# Patient Record
Sex: FEMALE | Race: WHITE | NOT HISPANIC OR LATINO | Employment: OTHER | URBAN - NONMETROPOLITAN AREA
[De-identification: names, ages, dates, MRNs, and addresses within clinical notes are randomized per-mention and may not be internally consistent; named-entity substitution may affect disease eponyms.]

---

## 2017-05-04 ENCOUNTER — OFFICE VISIT (OUTPATIENT)
Dept: PULMONOLOGY | Facility: CLINIC | Age: 72
End: 2017-05-04

## 2017-05-04 VITALS
HEART RATE: 113 BPM | BODY MASS INDEX: 21.09 KG/M2 | HEIGHT: 63 IN | DIASTOLIC BLOOD PRESSURE: 69 MMHG | WEIGHT: 119 LBS | OXYGEN SATURATION: 98 % | SYSTOLIC BLOOD PRESSURE: 112 MMHG

## 2017-05-04 DIAGNOSIS — R01.1 MURMUR, CARDIAC: ICD-10-CM

## 2017-05-04 DIAGNOSIS — R93.89 ABNORMAL CXR: Primary | ICD-10-CM

## 2017-05-04 DIAGNOSIS — Z87.891 HISTORY OF TOBACCO USE: ICD-10-CM

## 2017-05-04 PROCEDURE — 94060 EVALUATION OF WHEEZING: CPT | Performed by: INTERNAL MEDICINE

## 2017-05-04 PROCEDURE — 99204 OFFICE O/P NEW MOD 45 MIN: CPT | Performed by: INTERNAL MEDICINE

## 2017-05-04 PROCEDURE — 94727 GAS DIL/WSHOT DETER LNG VOL: CPT | Performed by: INTERNAL MEDICINE

## 2017-05-04 PROCEDURE — 94729 DIFFUSING CAPACITY: CPT | Performed by: INTERNAL MEDICINE

## 2017-05-30 ENCOUNTER — HOSPITAL ENCOUNTER (OUTPATIENT)
Dept: CT IMAGING | Facility: HOSPITAL | Age: 72
Discharge: HOME | End: 2017-05-30
Admitting: INTERNAL MEDICINE

## 2017-05-30 DIAGNOSIS — R93.89 ABNORMAL CXR: ICD-10-CM

## 2017-05-30 PROCEDURE — 71250 CT THORAX DX C-: CPT

## 2017-06-06 ENCOUNTER — OFFICE VISIT (OUTPATIENT)
Dept: INTERNAL MEDICINE | Facility: CLINIC | Age: 72
End: 2017-06-06

## 2017-06-06 ENCOUNTER — OFFICE VISIT (OUTPATIENT)
Dept: PULMONOLOGY | Facility: CLINIC | Age: 72
End: 2017-06-06

## 2017-06-06 VITALS
SYSTOLIC BLOOD PRESSURE: 140 MMHG | DIASTOLIC BLOOD PRESSURE: 60 MMHG | HEIGHT: 63 IN | WEIGHT: 122.4 LBS | BODY MASS INDEX: 21.69 KG/M2

## 2017-06-06 VITALS
HEIGHT: 63 IN | WEIGHT: 122 LBS | SYSTOLIC BLOOD PRESSURE: 120 MMHG | DIASTOLIC BLOOD PRESSURE: 78 MMHG | HEART RATE: 67 BPM | BODY MASS INDEX: 21.62 KG/M2 | OXYGEN SATURATION: 99 %

## 2017-06-06 DIAGNOSIS — Z12.4 ENCOUNTER FOR PAPANICOLAOU SMEAR FOR CERVICAL CANCER SCREENING: ICD-10-CM

## 2017-06-06 DIAGNOSIS — Z01.419 ENCOUNTER FOR WELL WOMAN EXAM WITH ROUTINE GYNECOLOGICAL EXAM: Primary | ICD-10-CM

## 2017-06-06 DIAGNOSIS — Z87.891 HISTORY OF TOBACCO USE: ICD-10-CM

## 2017-06-06 DIAGNOSIS — Z13.820 ENCOUNTER FOR SCREENING FOR OSTEOPOROSIS: ICD-10-CM

## 2017-06-06 DIAGNOSIS — Z11.59 NEED FOR HEPATITIS C SCREENING TEST: ICD-10-CM

## 2017-06-06 DIAGNOSIS — R93.89 ABNORMAL CXR: Primary | ICD-10-CM

## 2017-06-06 DIAGNOSIS — Z12.31 ENCOUNTER FOR SCREENING MAMMOGRAM FOR BREAST CANCER: ICD-10-CM

## 2017-06-06 DIAGNOSIS — E78.2 MIXED HYPERLIPIDEMIA: ICD-10-CM

## 2017-06-06 PROCEDURE — 88142 CYTOPATH C/V THIN LAYER: CPT | Performed by: INTERNAL MEDICINE

## 2017-06-06 PROCEDURE — G0101 CA SCREEN;PELVIC/BREAST EXAM: HCPCS | Performed by: INTERNAL MEDICINE

## 2017-06-06 PROCEDURE — 99213 OFFICE O/P EST LOW 20 MIN: CPT | Performed by: INTERNAL MEDICINE

## 2017-06-06 NOTE — PROGRESS NOTES
Pulmonary Office Follow-up    Subjective     Kimi Moraes is seen today at the office for   Chief Complaint   Patient presents with   • Follow-up     2 week         HPI  Kimi Moraes is a 72 y.o. female with a PMH significant for allergies and past tobacco use who presents forR review of recent CT chest.  She was initially seen by me on 5/4/17 for abnormal chest x-ray and I recommended obtaining a CT chest to determine if she had a lung nodule.  Since her last visit, she has established care with Dr. Santos and is set up to have routine health maintenance testing.  She has had no issues or complaints.        Patient Active Problem List   Diagnosis   • Abnormal CXR   • History of tobacco use   • Murmur, cardiac       Review of Systems  Review of Systems   Constitutional: Negative for fever and unexpected weight change.   HENT: Negative for congestion.    Respiratory: Negative for cough, shortness of breath and wheezing.    Cardiovascular: Negative for chest pain.     As described in the HPI. Otherwise, remainder of ROS (14 systems) were negative.    Medications, Allergies, Social, and Family Histories reviewed as per EMR.    Objective     Vitals:    06/06/17 1109   BP: 120/78   Pulse: 67   SpO2: 99%     Physical Exam   Constitutional: She is oriented to person, place, and time. Vital signs are normal. She appears well-developed and well-nourished.   HENT:   Head: Normocephalic and atraumatic.   Nose: Nose normal.   Mouth/Throat: Uvula is midline, oropharynx is clear and moist and mucous membranes are normal.   Mallampati 1   Eyes: Conjunctivae, EOM and lids are normal.   Neck: Trachea normal and normal range of motion. No tracheal tenderness present. No thyroid mass present.   Cardiovascular: Normal rate, regular rhythm and normal heart sounds.  Exam reveals no gallop.    No murmur heard.  Pulmonary/Chest: Effort normal and breath sounds normal.   Abdominal: Soft. Normal appearance and bowel sounds are normal.  There is no tenderness.       Vascular Status -  Her exam exhibits no right foot edema. Her exam exhibits no left foot edema.  Lymphadenopathy:        Head (right side): No submandibular adenopathy present.        Head (left side): No submandibular adenopathy present.     She has no cervical adenopathy.        Right: No supraclavicular adenopathy present.        Left: No supraclavicular adenopathy present.   Neurological: She is alert and oriented to person, place, and time.   Skin: Skin is warm and dry. No cyanosis. Nails show no clubbing.   Psychiatric: She has a normal mood and affect. Her speech is normal and behavior is normal. Judgment normal.   Nursing note and vitals reviewed.      IMAGING: CT chest without contrast 5/30/17 (independently reviewed and interpreted by me) showed no pulmonary nodule      Assessment/Plan     Kimi was seen today for follow-up.    Diagnoses and all orders for this visit:    Abnormal CXR    History of tobacco use         Discussion/ Recommendations:   CT of the chest did not show any nodules or other acute findings.  At this point, there is no further testing or surveillance necessary.  I have encouraged patient to proceed with health maintenance screening as recommended by Dr. Santos, as well as continuing to maintain a healthy lifestyle.    Return if symptoms worsen or fail to improve.          This document has been electronically signed by Paula De Santiago MD on June 6, 2017 11:30 AM      Dictated using Dragon

## 2017-06-07 NOTE — PROGRESS NOTES
"Subjective   History of Present Illness    Kimi Moraes is a 72 y.o. female who presents for GYN exam.  She reports no significant PMH except of hyperlipidemia. She has not had her cholesterol checked for several years  Patient has no GYN complaints.      Sexually active?: Yes  Postmenopausal?: Yes  HRT?: No  Hysterectomy?: No    Date last pap:   History of abnormal Pap smear: No  Date of last mammogram:   History of abnormal mammogram: No     History of bone density- several years ago.  Colonoscopy: 2014. - Hyperplastic polyp in sigmoid colon.    /60  Ht 63\" (160 cm)  Wt 122 lb 6.4 oz (55.5 kg)  BMI 21.68 kg/m2    Past Medical History:   Diagnosis Date   • Heart murmur    • Urinary tract infection    • Visual impairment        Past Surgical History:   Procedure Laterality Date   • COLONOSCOPY     • EYE SURGERY     • VARICOSE VEIN SURGERY         The following portions of the patient's history were reviewed and updated as appropriate: allergies, current medications, past family history, past medical history, past social history, past surgical history and problem list.    Review of Systems    Constitutional:  No fatigue, No weight loss, No weight gain, No fever and No chills   Respiratory: No dyspnea, No cough, No hemopytysis, No wheezing and No pleuritic pain   Cardiovascular: No chest pain, No palpitations, No arrhythmia, No orthopnea, No noctural dyspnea, No edema and No claudication   Breasts: No discharge from nipple, No breast tenderness and No breast mass   Gastrointestinal: No loss of appetite, No dysphagia, No abdominal pain, No nausea, No vomiting, No change in bowel habits, No diarrhea, No constipation and No blood in stool   Genitourinary: No increased frequency of urination, No dysuria, No hematuria, No nocturia, No urinary incontinence, No vaginal discharge, No abnormal vaginal bleeding, No pelvic pain, No menstrual problem and No menopausal problem   Skin: No skin rash, No " skin lesion, No dry skin, No pruritus and No nail problem   Neurologic: No headache, No dizziness, No lightheadedness, No syncope, No vertigo, No weakness, No numbness, No tremor and No paresthesia   Psychiatric: No difficulty sleeping, No mood swings, No feeling anxious, No confusion and No memory loss          Objective   Physical Exam    General:  Alert and oriented x 3, Cooperative, Well developed & well nourished and No acute distress   HEENT:  PERRLA, EOMI. Throat: clear. Ears: normal tympanic membranes.   Neck:    No JVD. No mass.   Abdomen: Soft, nondistended, non-tender, without masses or organomegaly. Rectal - no masses. Stool is heme negative.   Lungs: Clear. Good effort.   Cardiac: Regular. Murmur heard   Breast: Inspection negative, no nipple discharge or bleeding, no masses or nodularity palpable and Symmetrical breasts in shape, size, consistency   Genitourinary: Vulva normal, vaginal mucosa normal, bladder nondistended and nontender, cervix normal, uterus normal size and nontender, no adnexal/pelvic tenderness or masses, Bartholin's/[place]/Urethral gland WNL. PAP smear obtained.   Skin: Skin warm and dry and Pattern of hair growth normal       Assessment/Plan   Kimi was seen today for establish care.    Diagnoses and all orders for this visit:    Encounter for well woman exam with routine gynecological exam  -     CBC & Differential  -     Comprehensive Metabolic Panel    Encounter for screening mammogram for breast cancer  -     Mammo Screening Digital Tomosynthesis Bilateral With CAD    Encounter for Papanicolaou smear for cervical cancer screening  -     Cytology, thin prep pap (cervical)    Encounter for screening for osteoporosis  -     DEXA Bone Density Axial    Mixed hyperlipidemia  -     Lipid Panel  -     TSH    Need for hepatitis C screening test  -     Hepatitis C Antibody      All questions answered.  Recommended self breast exam  Pap smear obtained.  Labs today.  Mammogram.  Discussed  healthy lifestyle modifications.   Counseled on nutrition and physical activity.  Advised to start taking Calcium  mg bid and vitamin D 1000 Units for prevention of osteoporosis.        Patient declines immunization.      Follow up in 6 months.

## 2017-06-08 LAB
LAB AP CASE REPORT: NORMAL
LAB AP GYN ADDITIONAL INFORMATION: NORMAL
LAB AP GYN OTHER FINDINGS: NORMAL
Lab: NORMAL
PATH INTERP SPEC-IMP: NORMAL
STAT OF ADQ CVX/VAG CYTO-IMP: NORMAL

## 2017-06-19 ENCOUNTER — TELEPHONE (OUTPATIENT)
Dept: INTERNAL MEDICINE | Facility: CLINIC | Age: 72
End: 2017-06-19

## 2017-06-19 ENCOUNTER — APPOINTMENT (OUTPATIENT)
Dept: LAB | Facility: HOSPITAL | Age: 72
End: 2017-06-19

## 2017-06-19 LAB
ALBUMIN SERPL-MCNC: 4.1 G/DL (ref 3.4–4.8)
ALBUMIN/GLOB SERPL: 1.5 G/DL (ref 1.1–1.8)
ALP SERPL-CCNC: 61 U/L (ref 38–126)
ALT SERPL W P-5'-P-CCNC: 34 U/L (ref 9–52)
ANION GAP SERPL CALCULATED.3IONS-SCNC: 11 MMOL/L (ref 5–15)
ARTICHOKE IGE QN: 203 MG/DL (ref 1–129)
AST SERPL-CCNC: 32 U/L (ref 14–36)
BASOPHILS # BLD AUTO: 0.02 10*3/MM3 (ref 0–0.2)
BASOPHILS NFR BLD AUTO: 0.3 % (ref 0–2)
BILIRUB SERPL-MCNC: 0.6 MG/DL (ref 0.2–1.3)
BUN BLD-MCNC: 12 MG/DL (ref 7–21)
BUN/CREAT SERPL: 17.4 (ref 7–25)
CALCIUM SPEC-SCNC: 9.2 MG/DL (ref 8.4–10.2)
CHLORIDE SERPL-SCNC: 100 MMOL/L (ref 95–110)
CHOLEST SERPL-MCNC: 277 MG/DL (ref 0–199)
CO2 SERPL-SCNC: 29 MMOL/L (ref 22–31)
CREAT BLD-MCNC: 0.69 MG/DL (ref 0.5–1)
DEPRECATED RDW RBC AUTO: 40.8 FL (ref 36.4–46.3)
EOSINOPHIL # BLD AUTO: 0.04 10*3/MM3 (ref 0–0.7)
EOSINOPHIL NFR BLD AUTO: 0.6 % (ref 0–7)
ERYTHROCYTE [DISTWIDTH] IN BLOOD BY AUTOMATED COUNT: 12.7 % (ref 11.5–14.5)
GFR SERPL CREATININE-BSD FRML MDRD: 84 ML/MIN/1.73 (ref 39–90)
GLOBULIN UR ELPH-MCNC: 2.7 GM/DL (ref 2.3–3.5)
GLUCOSE BLD-MCNC: 140 MG/DL (ref 60–100)
HCT VFR BLD AUTO: 42.8 % (ref 35–45)
HDLC SERPL-MCNC: 71 MG/DL (ref 60–200)
HGB BLD-MCNC: 14.8 G/DL (ref 12–15.5)
IMM GRANULOCYTES # BLD: 0.02 10*3/MM3 (ref 0–0.02)
IMM GRANULOCYTES NFR BLD: 0.3 % (ref 0–0.5)
LDLC/HDLC SERPL: 2.61 {RATIO} (ref 0–3.22)
LYMPHOCYTES # BLD AUTO: 1.03 10*3/MM3 (ref 0.6–4.2)
LYMPHOCYTES NFR BLD AUTO: 16.4 % (ref 10–50)
MCH RBC QN AUTO: 30.6 PG (ref 26.5–34)
MCHC RBC AUTO-ENTMCNC: 34.6 G/DL (ref 31.4–36)
MCV RBC AUTO: 88.6 FL (ref 80–98)
MONOCYTES # BLD AUTO: 0.48 10*3/MM3 (ref 0–0.9)
MONOCYTES NFR BLD AUTO: 7.6 % (ref 0–12)
NEUTROPHILS # BLD AUTO: 4.7 10*3/MM3 (ref 2–8.6)
NEUTROPHILS NFR BLD AUTO: 74.8 % (ref 37–80)
PLATELET # BLD AUTO: 253 10*3/MM3 (ref 150–450)
PMV BLD AUTO: 10.9 FL (ref 8–12)
POTASSIUM BLD-SCNC: 4.1 MMOL/L (ref 3.5–5.1)
PROT SERPL-MCNC: 6.8 G/DL (ref 6.3–8.6)
RBC # BLD AUTO: 4.83 10*6/MM3 (ref 3.77–5.16)
SODIUM BLD-SCNC: 140 MMOL/L (ref 137–145)
TRIGL SERPL-MCNC: 102 MG/DL (ref 20–199)
TSH SERPL DL<=0.05 MIU/L-ACNC: 1.6 MIU/ML (ref 0.46–4.68)
WBC NRBC COR # BLD: 6.29 10*3/MM3 (ref 3.2–9.8)

## 2017-06-19 PROCEDURE — 85025 COMPLETE CBC W/AUTO DIFF WBC: CPT | Performed by: INTERNAL MEDICINE

## 2017-06-19 PROCEDURE — 80053 COMPREHEN METABOLIC PANEL: CPT | Performed by: INTERNAL MEDICINE

## 2017-06-19 PROCEDURE — 36415 COLL VENOUS BLD VENIPUNCTURE: CPT | Performed by: INTERNAL MEDICINE

## 2017-06-19 PROCEDURE — 84443 ASSAY THYROID STIM HORMONE: CPT | Performed by: INTERNAL MEDICINE

## 2017-06-19 PROCEDURE — 86803 HEPATITIS C AB TEST: CPT | Performed by: INTERNAL MEDICINE

## 2017-06-19 PROCEDURE — 80061 LIPID PANEL: CPT | Performed by: INTERNAL MEDICINE

## 2017-06-19 NOTE — TELEPHONE ENCOUNTER
Spoke with pt let her know bonedensity shows osteopenia DR SANCHEZ recommends otc vitamin d 1000mcg daily  And  calcium 500mg bid

## 2017-06-20 ENCOUNTER — TELEPHONE (OUTPATIENT)
Dept: INTERNAL MEDICINE | Facility: CLINIC | Age: 72
End: 2017-06-20

## 2017-06-20 DIAGNOSIS — R92.8 ABNORMALITY OF RIGHT BREAST ON SCREENING MAMMOGRAM: Primary | ICD-10-CM

## 2017-06-20 NOTE — TELEPHONE ENCOUNTER
Spoke with pt let her  know there was a questionable area on right breast they requiring more imaging and DR SANCHEZ  Has odered these and [facility] will call her with an appt....

## 2017-06-21 LAB — HCV AB SER DONR QL: NEGATIVE

## 2018-10-31 ENCOUNTER — OFFICE VISIT (OUTPATIENT)
Dept: FAMILY MEDICINE CLINIC | Facility: CLINIC | Age: 73
End: 2018-10-31

## 2018-10-31 VITALS
HEART RATE: 108 BPM | BODY MASS INDEX: 22.25 KG/M2 | HEIGHT: 63 IN | WEIGHT: 125.6 LBS | TEMPERATURE: 98.1 F | DIASTOLIC BLOOD PRESSURE: 70 MMHG | SYSTOLIC BLOOD PRESSURE: 150 MMHG

## 2018-10-31 DIAGNOSIS — J01.10 ACUTE NON-RECURRENT FRONTAL SINUSITIS: Primary | ICD-10-CM

## 2018-10-31 DIAGNOSIS — K12.0 ORAL APHTHOUS ULCER: ICD-10-CM

## 2018-10-31 PROCEDURE — 99213 OFFICE O/P EST LOW 20 MIN: CPT | Performed by: NURSE PRACTITIONER

## 2018-10-31 RX ORDER — TRIAMCINOLONE ACETONIDE 40 MG/ML
80 INJECTION, SUSPENSION INTRA-ARTICULAR; INTRAMUSCULAR ONCE
Status: DISCONTINUED | OUTPATIENT
Start: 2018-10-31 | End: 2018-11-27

## 2018-10-31 RX ORDER — AZITHROMYCIN 200 MG/5ML
POWDER, FOR SUSPENSION ORAL
Qty: 42 ML | Refills: 0 | Status: SHIPPED | OUTPATIENT
Start: 2018-10-31 | End: 2018-11-27

## 2018-10-31 RX ORDER — FLUTICASONE PROPIONATE 50 MCG
2 SPRAY, SUSPENSION (ML) NASAL DAILY
Qty: 1 BOTTLE | Refills: 0 | Status: SHIPPED | OUTPATIENT
Start: 2018-10-31 | End: 2018-11-27

## 2018-10-31 NOTE — PROGRESS NOTES
Subjective   Kimi Buck is a 73 y.o. female. Patient here today with complaints of URI  pt here today with complaints of sinus congestion, pain and pressure, sore in mouth, saw walk in last week, was given magic mouthwash and soreness in mouth has improved but still sore slightly with eating spicy foods.     Vitals:    10/31/18 1023   BP: 150/70   Pulse: 108   Temp: 98.1 °F (36.7 °C)     Past Medical History:   Diagnosis Date   • Heart murmur    • Urinary tract infection    • Visual impairment      Sinusitis   This is a new problem. The current episode started in the past 7 days. The problem has been gradually worsening since onset. There has been no fever. The pain is moderate. Associated symptoms include congestion, coughing, headaches, a hoarse voice, sinus pressure, sneezing and a sore throat. Pertinent negatives include no chills, diaphoresis, ear pain, neck pain, shortness of breath or swollen glands. Past treatments include nothing. The treatment provided no relief.        The following portions of the patient's history were reviewed and updated as appropriate: allergies, current medications, past family history, past medical history, past social history, past surgical history and problem list.    Review of Systems   Constitutional: Negative.  Negative for chills and diaphoresis.   HENT: Positive for congestion, hoarse voice, sinus pressure, sneezing and sore throat. Negative for ear pain.    Eyes: Negative.    Respiratory: Positive for cough. Negative for shortness of breath.    Cardiovascular: Negative.    Gastrointestinal: Negative.    Endocrine: Negative.    Genitourinary: Negative.    Musculoskeletal: Negative.  Negative for neck pain.   Skin: Negative.    Allergic/Immunologic: Negative.    Neurological: Positive for headaches.   Hematological: Negative.    Psychiatric/Behavioral: Negative.        Objective   Physical Exam   Constitutional: She is oriented to person, place, and time. She appears  well-developed and well-nourished. No distress.   HENT:   Head: Normocephalic and atraumatic.   Right Ear: Hearing, external ear and ear canal normal. Tympanic membrane is bulging.   Left Ear: Hearing, external ear and ear canal normal. Tympanic membrane is bulging.   Nose: Rhinorrhea present. Right sinus exhibits maxillary sinus tenderness and frontal sinus tenderness. Left sinus exhibits maxillary sinus tenderness and frontal sinus tenderness.   Mouth/Throat: Uvula is midline and mucous membranes are normal. Oral lesions present. Posterior oropharyngeal erythema (with erythema and cobblestoning appearance ntoed) present. No tonsillar exudate.   Oral ulcerations on upper gums bilat, mildly erythematous and tender as well    Neck: Neck supple.   Cardiovascular: Normal rate, regular rhythm and normal heart sounds.  Exam reveals no gallop and no friction rub.    No murmur heard.  Pulmonary/Chest: Effort normal and breath sounds normal. No respiratory distress. She has no wheezes. She has no rales.   Lymphadenopathy:     She has no cervical adenopathy.   Neurological: She is alert and oriented to person, place, and time.   Skin: Skin is warm and dry. No rash noted. She is not diaphoretic. No erythema. No pallor.   Psychiatric: She has a normal mood and affect. Her behavior is normal.   Nursing note and vitals reviewed.      Assessment/Plan   Kimi was seen today for uri.    Diagnoses and all orders for this visit:    Acute non-recurrent frontal sinusitis  -     triamcinolone acetonide (KENALOG-40) injection 80 mg; Inject 2 mL into the appropriate muscle as directed by prescriber 1 (One) Time.    Oral aphthous ulcer    Other orders  -     fluticasone (FLONASE) 50 MCG/ACT nasal spray; 2 sprays into the nostril(s) as directed by provider Daily.  -     azithromycin (ZITHROMAX) 200 MG/5ML suspension; Give the patient 572 mg (14 ml) by mouth the first day then 284 mg (7 ml) by mouth daily for 4 days.  -     diphenhydrAMINE  12.5 MG/5ML elixir 20 mL, aluminum-magnesium hydroxide-simethicone 400-400-40 MG/5ML suspension 20 mL, lidocaine viscous 2 % solution 15 mL; Swish and spit 15 mL Every 4 (Four) Hours As Needed for Mucositis or stomatitis.    Patient relates she can take liquid medication, given Zithromax suspension, Flonase, Kenalog injection and magic mouthwash prescription.  If symptoms should persist or worsen she will return to clinic for follow-up.  She will sign release for records since she wants to establish care here.  We'll follow-up as needed for chronic conditions/refills.  She is aware and is in agreement to this plan.  All questions and concerns are addressed with understanding noted.

## 2018-11-27 ENCOUNTER — OFFICE VISIT (OUTPATIENT)
Dept: FAMILY MEDICINE CLINIC | Facility: CLINIC | Age: 73
End: 2018-11-27

## 2018-11-27 ENCOUNTER — LAB (OUTPATIENT)
Dept: LAB | Facility: OTHER | Age: 73
End: 2018-11-27

## 2018-11-27 VITALS
OXYGEN SATURATION: 98 % | DIASTOLIC BLOOD PRESSURE: 70 MMHG | WEIGHT: 119.8 LBS | BODY MASS INDEX: 21.23 KG/M2 | HEIGHT: 63 IN | HEART RATE: 115 BPM | SYSTOLIC BLOOD PRESSURE: 130 MMHG | TEMPERATURE: 97.6 F

## 2018-11-27 DIAGNOSIS — K12.0 ORAL APHTHOUS ULCER: ICD-10-CM

## 2018-11-27 DIAGNOSIS — R13.10 DYSPHAGIA, UNSPECIFIED TYPE: ICD-10-CM

## 2018-11-27 DIAGNOSIS — E53.8 LOW VITAMIN B12 LEVEL: ICD-10-CM

## 2018-11-27 DIAGNOSIS — R93.3 ABNORMAL FINDINGS ON DIAGNOSTIC IMAGING OF OTHER PARTS OF DIGESTIVE TRACT: ICD-10-CM

## 2018-11-27 DIAGNOSIS — R73.09 ELEVATED GLUCOSE: ICD-10-CM

## 2018-11-27 DIAGNOSIS — R68.2 DRY MOUTH: ICD-10-CM

## 2018-11-27 DIAGNOSIS — Z12.39 BREAST CANCER SCREENING: ICD-10-CM

## 2018-11-27 DIAGNOSIS — R13.10 DYSPHAGIA, UNSPECIFIED TYPE: Primary | ICD-10-CM

## 2018-11-27 DIAGNOSIS — Z00.00 MEDICARE ANNUAL WELLNESS VISIT, INITIAL: Primary | ICD-10-CM

## 2018-11-27 LAB
ALBUMIN SERPL-MCNC: 4.6 G/DL (ref 3.5–5)
ALBUMIN/GLOB SERPL: 2 G/DL (ref 1.1–1.8)
ALP SERPL-CCNC: 62 U/L (ref 38–126)
ALT SERPL W P-5'-P-CCNC: 17 U/L
ANION GAP SERPL CALCULATED.3IONS-SCNC: 8 MMOL/L (ref 5–15)
AST SERPL-CCNC: 23 U/L (ref 14–36)
BACTERIA UR QL AUTO: ABNORMAL /HPF
BASOPHILS # BLD AUTO: 0.01 10*3/MM3 (ref 0–0.2)
BASOPHILS NFR BLD AUTO: 0.2 % (ref 0–2)
BILIRUB SERPL-MCNC: 0.7 MG/DL (ref 0.2–1.3)
BILIRUB UR QL STRIP: NEGATIVE
BUN BLD-MCNC: 14 MG/DL (ref 7–17)
BUN/CREAT SERPL: 18.9 (ref 7–25)
CALCIUM SPEC-SCNC: 9.8 MG/DL (ref 8.4–10.2)
CHLORIDE SERPL-SCNC: 105 MMOL/L (ref 98–107)
CHOLEST SERPL-MCNC: 250 MG/DL (ref 150–200)
CLARITY UR: CLEAR
CO2 SERPL-SCNC: 29 MMOL/L (ref 22–30)
COLOR UR: YELLOW
CREAT BLD-MCNC: 0.74 MG/DL (ref 0.52–1.04)
DEPRECATED RDW RBC AUTO: 41.9 FL (ref 36.4–46.3)
EOSINOPHIL # BLD AUTO: 0.05 10*3/MM3 (ref 0–0.7)
EOSINOPHIL NFR BLD AUTO: 0.9 % (ref 0–7)
ERYTHROCYTE [DISTWIDTH] IN BLOOD BY AUTOMATED COUNT: 13.5 % (ref 11.5–14.5)
GFR SERPL CREATININE-BSD FRML MDRD: 77 ML/MIN/1.73 (ref 39–90)
GLOBULIN UR ELPH-MCNC: 2.3 GM/DL (ref 2.3–3.5)
GLUCOSE BLD-MCNC: 167 MG/DL (ref 74–99)
GLUCOSE UR STRIP-MCNC: NEGATIVE MG/DL
HCT VFR BLD AUTO: 37.9 % (ref 35–45)
HDLC SERPL-MCNC: 55 MG/DL (ref 40–59)
HGB BLD-MCNC: 12.4 G/DL (ref 12–15.5)
HGB UR QL STRIP.AUTO: NEGATIVE
HYALINE CASTS UR QL AUTO: ABNORMAL /LPF
KETONES UR QL STRIP: NEGATIVE
LDLC SERPL CALC-MCNC: 166 MG/DL
LDLC/HDLC SERPL: 3.01 {RATIO} (ref 0–3.22)
LEUKOCYTE ESTERASE UR QL STRIP.AUTO: ABNORMAL
LYMPHOCYTES # BLD AUTO: 1.22 10*3/MM3 (ref 0.6–4.2)
LYMPHOCYTES NFR BLD AUTO: 23 % (ref 10–50)
MCH RBC QN AUTO: 29 PG (ref 26.5–34)
MCHC RBC AUTO-ENTMCNC: 32.7 G/DL (ref 31.4–36)
MCV RBC AUTO: 88.6 FL (ref 80–98)
MONOCYTES # BLD AUTO: 0.58 10*3/MM3 (ref 0–0.9)
MONOCYTES NFR BLD AUTO: 10.9 % (ref 0–12)
MUCOUS THREADS URNS QL MICRO: ABNORMAL /HPF
NEUTROPHILS # BLD AUTO: 3.44 10*3/MM3 (ref 2–8.6)
NEUTROPHILS NFR BLD AUTO: 65 % (ref 37–80)
NITRITE UR QL STRIP: NEGATIVE
PH UR STRIP.AUTO: 5.5 [PH] (ref 5.5–8)
PLATELET # BLD AUTO: 288 10*3/MM3 (ref 150–450)
PMV BLD AUTO: 10.6 FL (ref 8–12)
POTASSIUM BLD-SCNC: 4.2 MMOL/L (ref 3.4–5)
PROT SERPL-MCNC: 6.9 G/DL (ref 6.3–8.2)
PROT UR QL STRIP: NEGATIVE
RBC # BLD AUTO: 4.28 10*6/MM3 (ref 3.77–5.16)
RBC # UR: ABNORMAL /HPF
REF LAB TEST METHOD: ABNORMAL
SODIUM BLD-SCNC: 142 MMOL/L (ref 137–145)
SP GR UR STRIP: >=1.03 (ref 1–1.03)
SQUAMOUS #/AREA URNS HPF: ABNORMAL /HPF
TRIGL SERPL-MCNC: 146 MG/DL
TSH SERPL DL<=0.05 MIU/L-ACNC: 2.62 MIU/ML (ref 0.46–4.68)
UROBILINOGEN UR QL STRIP: ABNORMAL
VIT B12 BLD-MCNC: 509 PG/ML (ref 239–931)
VLDLC SERPL-MCNC: 29.2 MG/DL
WBC NRBC COR # BLD: 5.3 10*3/MM3 (ref 3.2–9.8)
WBC UR QL AUTO: ABNORMAL /HPF

## 2018-11-27 PROCEDURE — 84443 ASSAY THYROID STIM HORMONE: CPT | Performed by: NURSE PRACTITIONER

## 2018-11-27 PROCEDURE — 80061 LIPID PANEL: CPT | Performed by: NURSE PRACTITIONER

## 2018-11-27 PROCEDURE — 81001 URINALYSIS AUTO W/SCOPE: CPT | Performed by: NURSE PRACTITIONER

## 2018-11-27 PROCEDURE — 85025 COMPLETE CBC W/AUTO DIFF WBC: CPT | Performed by: NURSE PRACTITIONER

## 2018-11-27 PROCEDURE — 96160 PT-FOCUSED HLTH RISK ASSMT: CPT | Performed by: NURSE PRACTITIONER

## 2018-11-27 PROCEDURE — 36415 COLL VENOUS BLD VENIPUNCTURE: CPT | Performed by: NURSE PRACTITIONER

## 2018-11-27 PROCEDURE — 82607 VITAMIN B-12: CPT | Performed by: NURSE PRACTITIONER

## 2018-11-27 PROCEDURE — 80053 COMPREHEN METABOLIC PANEL: CPT | Performed by: NURSE PRACTITIONER

## 2018-11-27 PROCEDURE — G0438 PPPS, INITIAL VISIT: HCPCS | Performed by: NURSE PRACTITIONER

## 2018-11-27 PROCEDURE — 83036 HEMOGLOBIN GLYCOSYLATED A1C: CPT | Performed by: NURSE PRACTITIONER

## 2018-11-27 PROCEDURE — 99213 OFFICE O/P EST LOW 20 MIN: CPT | Performed by: NURSE PRACTITIONER

## 2018-11-27 RX ORDER — TRIAMCINOLONE ACETONIDE 0.1 %
PASTE (GRAM) DENTAL 3 TIMES DAILY
Qty: 5 G | Refills: 1 | Status: SHIPPED | OUTPATIENT
Start: 2018-11-27 | End: 2019-09-03

## 2018-11-27 RX ORDER — CYANOCOBALAMIN 1000 UG/ML
1000 INJECTION, SOLUTION INTRAMUSCULAR; SUBCUTANEOUS
Status: DISCONTINUED | OUTPATIENT
Start: 2018-11-27 | End: 2019-08-08

## 2018-11-27 NOTE — PROGRESS NOTES
"Subjective   Kimi Buck is a 73 y.o. female. Patient here today with complaints of URI and Medicare Wellness-Initial Visit  pt here today with complaints of mouth sores and pain x 1 month, has been using magic mouthwash with minimal relief of symptoms. Denies fever. Has been seen by dentist x 2 and by urgent care and treated with above medicine. Reports has also started using b12 complex but mouth still \"burning\". In addition, she is reporting dysphagia, states she chokes easily, has trouble swallowing, reports dry mouth. Due for labs.     Vitals:    11/27/18 0828   BP: 130/70   Pulse: 115   Temp: 97.6 °F (36.4 °C)   SpO2: 98%     Past Medical History:   Diagnosis Date   • Heart murmur    • Urinary tract infection    • Visual impairment      Mouth Lesions    The current episode started more than 2 weeks ago. The onset is undetermined. The problem occurs continuously. The problem has been unchanged. The problem is moderate. The symptoms are relieved by one or more OTC medications and one or more prescription drugs. The symptoms are aggravated by eating and drinking. Associated symptoms include mouth sores. Pertinent negatives include no orthopnea, no fever, no decreased vision, no double vision, no eye itching, no photophobia, no abdominal pain, no constipation, no diarrhea, no nausea, no vomiting, no congestion, no ear discharge, no ear pain, no headaches, no hearing loss, no rhinorrhea, no sore throat, no stridor, no swollen glands, no muscle aches, no neck pain, no neck stiffness, no cough, no URI, no wheezing, no rash, no eye discharge, no eye pain and no eye redness.        The following portions of the patient's history were reviewed and updated as appropriate: allergies, current medications, past family history, past medical history, past social history, past surgical history and problem list.    Review of Systems   Constitutional: Negative.  Negative for fever.   HENT: Positive for mouth sores. Negative " for congestion, ear discharge, ear pain, hearing loss, rhinorrhea and sore throat.    Eyes: Negative.  Negative for double vision, photophobia, pain, discharge, redness and itching.   Respiratory: Negative.  Negative for cough, wheezing and stridor.    Cardiovascular: Negative.  Negative for orthopnea.   Gastrointestinal: Negative.  Negative for abdominal pain, constipation, diarrhea, nausea and vomiting.   Endocrine: Negative.    Genitourinary: Negative.    Musculoskeletal: Negative.  Negative for neck pain.   Skin: Negative.  Negative for rash.   Allergic/Immunologic: Negative.    Neurological: Negative.  Negative for headaches.   Hematological: Negative.    Psychiatric/Behavioral: Negative.        Objective   Physical Exam   Constitutional: She is oriented to person, place, and time. She appears well-developed and well-nourished. No distress.   HENT:   Head: Normocephalic and atraumatic.   Right Ear: External ear normal.   Left Ear: External ear normal.   Nose: Nose normal.   Mouth/Throat: Uvula is midline. No tonsillar exudate.   Oral ulcerations noted on gum, buccal mucosa, tongue, worse at tip of tongue   Neck: Neck supple.   Cardiovascular: Normal rate and regular rhythm. Exam reveals no gallop and no friction rub.   Murmur heard.  Pulmonary/Chest: Effort normal and breath sounds normal. No stridor. No respiratory distress. She has no wheezes. She has no rales.   Lymphadenopathy:     She has no cervical adenopathy.   Neurological: She is alert and oriented to person, place, and time.   Skin: Skin is warm and dry. No rash noted. She is not diaphoretic. No erythema. No pallor.   Psychiatric: She has a normal mood and affect. Her behavior is normal.   Nursing note and vitals reviewed.      Assessment/Plan   Kimi was seen today for uri and medicare wellness-initial visit.    Diagnoses and all orders for this visit:    Medicare annual wellness visit, initial    Dry mouth  -     CBC & Differential; Future  -      Comprehensive metabolic panel; Future  -     Vitamin B12; Future  -     TSH; Future  -     Lipid panel; Future  -     Urinalysis With Culture If Indicated - Urine, Clean Catch; Future  -     Mammo Screening Bilateral With CAD; Future    Dysphagia, unspecified type  -     CBC & Differential; Future  -     Comprehensive metabolic panel; Future  -     Vitamin B12; Future  -     TSH; Future  -     Lipid panel; Future  -     Urinalysis With Culture If Indicated - Urine, Clean Catch; Future  -     Mammo Screening Bilateral With CAD; Future    Oral aphthous ulcer  -     CBC & Differential; Future  -     Comprehensive metabolic panel; Future  -     Vitamin B12; Future  -     TSH; Future  -     Lipid panel; Future  -     Urinalysis With Culture If Indicated - Urine, Clean Catch; Future  -     Mammo Screening Bilateral With CAD; Future    Breast cancer screening  -     Mammo Screening Bilateral With CAD; Future    Abnormal findings on diagnostic imaging of other parts of digestive tract   -     Lipid panel; Future    Low vitamin B12 level  -     cyanocobalamin injection 1,000 mcg; Inject 1 mL into the appropriate muscle as directed by prescriber Every 28 (Twenty-Eight) Days.    Other orders  -     triamcinolone (KENALOG) 0.1 % paste; Apply  to teeth 3 (Three) Times a Day. Apply to gums, buccal areas tid prn    she is advised to restart multi vitamin OTC, is given b12 inj after above labs are obtained  She will be informed of results, advised to RTC should symptoms not completely resolve  Mammogram is ordered since it is overdue. She will scheduled at her convenience  She is given kenalog paste to apply orally tid prn until symptoms clear  She is aware and is in agreement to this plan  Health maintenance was reviewed, updated, medicare wellness completed  All questions and concerns are addressed with understanding noted.

## 2018-11-27 NOTE — PROGRESS NOTES
QUICK REFERENCE INFORMATION:  The ABCs of the Annual Wellness Visit    Initial Medicare Wellness Visit    HEALTH RISK ASSESSMENT    1945    Recent Hospitalizations:  No hospitalization(s) within the last year..        Current Medical Providers:  Patient Care Team:  Bhakti Tran APRN as PCP - General (Family Medicine)        Smoking Status:  Social History     Tobacco Use   Smoking Status Former Smoker   Smokeless Tobacco Never Used       Alcohol Consumption:  Social History     Substance and Sexual Activity   Alcohol Use No       Depression Screen:   PHQ-2/PHQ-9 Depression Screening 10/31/2018   Little interest or pleasure in doing things 0   Feeling down, depressed, or hopeless 0   Total Score 0       Health Habits and Functional and Cognitive Screening:  Functional & Cognitive Status 11/27/2018   Do you have difficulty preparing food and eating? No   Do you have difficulty bathing yourself, getting dressed or grooming yourself? No   Do you have difficulty using the toilet? No   Do you have difficulty moving around from place to place? No   Do you have trouble with steps or getting out of a bed or a chair? No   In the past year have you fallen or experienced a near fall? No   Current Diet Well Balanced Diet   Dental Exam Up to date   Eye Exam Up to date   Exercise (times per week) 0 times per week   Current Exercise Activities Include None   Do you need help using the phone?  No   Are you deaf or do you have serious difficulty hearing?  No   Do you need help with transportation? No   Do you need help shopping? No   Do you need help preparing meals?  No   Do you need help with housework?  No   Do you need help with laundry? No   Do you need help taking your medications? No   Do you need help managing money? No   Do you ever drive or ride in a car without wearing a seat belt? Yes   Have you felt unusual stress, anger or loneliness in the last month? Yes   Who do you live with? Spouse   If you need help, do you  have trouble finding someone available to you? No   Have you been bothered in the last four weeks by sexual problems? No   Do you have difficulty concentrating, remembering or making decisions? Yes           Does the patient have evidence of cognitive impairment? No    Asiprin use counseling: Taking ASA appropriately as indicated      Recent Lab Results:    Visual Acuity:  No exam data present    Age-appropriate Screening Schedule:  Refer to the list below for future screening recommendations based on patient's age, sex and/or medical conditions. Orders for these recommended tests are listed in the plan section. The patient has been provided with a written plan.    Health Maintenance   Topic Date Due   • ZOSTER VACCINE (1 of 2) 03/09/1996   • PNEUMOCOCCAL VACCINES (65+ LOW/MEDIUM RISK) (2 of 2 - PPSV23) 06/06/2018   • INFLUENZA VACCINE  08/01/2018   • MAMMOGRAM  07/05/2019   • LIPID PANEL  11/27/2019   • COLONOSCOPY  07/16/2024   • TDAP/TD VACCINES (2 - Td) 06/06/2027        Subjective   History of Present Illness    Kimi Buck is a 73 y.o. female who presents for an Annual Wellness Visit.    The following portions of the patient's history were reviewed and updated as appropriate:   She  has a past medical history of Heart murmur, Urinary tract infection, and Visual impairment.  She does not have any pertinent problems on file.  She  has a past surgical history that includes Eye surgery; Colonoscopy; and Varicose vein surgery.  Her family history includes COPD in her mother; Cancer in her mother; Vision loss in her father and mother.  She  reports that she has quit smoking. she has never used smokeless tobacco. She reports that she does not drink alcohol or use drugs.  Current Outpatient Medications   Medication Sig Dispense Refill   • triamcinolone (KENALOG) 0.1 % paste Apply  to teeth 3 (Three) Times a Day. Apply to gums, buccal areas tid prn 5 g 1     Current Facility-Administered Medications   Medication  "Dose Route Frequency Provider Last Rate Last Dose   • cyanocobalamin injection 1,000 mcg  1,000 mcg Intramuscular Q28 Days Bhakti Tran, TESFAYE         No current outpatient medications on file prior to visit.     Current Facility-Administered Medications on File Prior to Visit   Medication   • [DISCONTINUED] triamcinolone acetonide (KENALOG-40) injection 80 mg     She is allergic to penicillins..    Outpatient Medications Prior to Visit   Medication Sig Dispense Refill   • azithromycin (ZITHROMAX) 200 MG/5ML suspension Give the patient 572 mg (14 ml) by mouth the first day then 284 mg (7 ml) by mouth daily for 4 days. 42 mL 0   • diphenhydrAMINE 12.5 MG/5ML elixir 20 mL, aluminum-magnesium hydroxide-simethicone 400-400-40 MG/5ML suspension 20 mL, lidocaine viscous 2 % solution 15 mL Swish and spit 15 mL Every 4 (Four) Hours As Needed for Mucositis or stomatitis. 8 oz 0   • fluticasone (FLONASE) 50 MCG/ACT nasal spray 2 sprays into the nostril(s) as directed by provider Daily. 1 bottle 0     Facility-Administered Medications Prior to Visit   Medication Dose Route Frequency Provider Last Rate Last Dose   • triamcinolone acetonide (KENALOG-40) injection 80 mg  80 mg Intramuscular Once Bhakti Tran APRN           Patient Active Problem List   Diagnosis   • Abnormal CXR   • History of tobacco use   • Murmur, cardiac       Advance Care Planning:  has NO advance directive - not interested in additional information    Identification of Risk Factors:  Risk factors include: weight  and unhealthy diet.    Review of Systems    Compared to one year ago, the patient feels her physical health is better.  Compared to one year ago, the patient feels her mental health is better.    Objective     Physical Exam    Vitals:    11/27/18 0828   BP: 130/70   Pulse: 115   Temp: 97.6 °F (36.4 °C)   SpO2: 98%   Weight: 54.3 kg (119 lb 12.8 oz)   Height: 160 cm (63\")   PainSc: 0-No pain       Patient's Body mass index is 21.22 kg/m². BMI is " within normal parameters. No follow-up required.      Assessment/Plan   Patient Self-Management and Personalized Health Advice  The patient has been provided with information about: diet, exercise, designing advance directives, supplements and mental health concerns and preventive services including:   · Advance directive, Diabetes screening, see lab orders, Exercise counseling provided, Fall Risk assessment done, Fall Risk plan of care done, Influenza vaccine, Nutrition counseling provided, Screening mammography, referral placed.    Visit Diagnoses:    ICD-10-CM ICD-9-CM   1. Medicare annual wellness visit, initial Z00.00 V70.0   2. Dry mouth R68.2 527.7   3. Dysphagia, unspecified type R13.10 787.20   4. Oral aphthous ulcer K12.0 528.2   5. Breast cancer screening Z12.31 V76.10   6. Abnormal findings on diagnostic imaging of other parts of digestive tract  R93.3 793.4   7. Low vitamin B12 level E53.8 266.2       Orders Placed This Encounter   Procedures   • Mammo Screening Bilateral With CAD     Standing Status:   Future     Standing Expiration Date:   11/27/2019     Order Specific Question:   Reason for Exam:     Answer:   due   • Comprehensive metabolic panel     Standing Status:   Future     Number of Occurrences:   1     Standing Expiration Date:   11/27/2019   • Vitamin B12     Standing Status:   Future     Number of Occurrences:   1     Standing Expiration Date:   11/27/2019   • TSH     Standing Status:   Future     Number of Occurrences:   1     Standing Expiration Date:   11/27/2019   • Lipid panel     Standing Status:   Future     Number of Occurrences:   1     Standing Expiration Date:   11/27/2019   • Urinalysis With Culture If Indicated - Urine, Clean Catch     Standing Status:   Future     Number of Occurrences:   1     Standing Expiration Date:   11/27/2019   • CBC & Differential     Standing Status:   Future     Number of Occurrences:   1     Standing Expiration Date:   1/26/2019     Order Specific  Question:   Manual Differential     Answer:   No       Outpatient Encounter Medications as of 11/27/2018   Medication Sig Dispense Refill   • triamcinolone (KENALOG) 0.1 % paste Apply  to teeth 3 (Three) Times a Day. Apply to gums, buccal areas tid prn 5 g 1   • [DISCONTINUED] azithromycin (ZITHROMAX) 200 MG/5ML suspension Give the patient 572 mg (14 ml) by mouth the first day then 284 mg (7 ml) by mouth daily for 4 days. 42 mL 0   • [DISCONTINUED] diphenhydrAMINE 12.5 MG/5ML elixir 20 mL, aluminum-magnesium hydroxide-simethicone 400-400-40 MG/5ML suspension 20 mL, lidocaine viscous 2 % solution 15 mL Swish and spit 15 mL Every 4 (Four) Hours As Needed for Mucositis or stomatitis. 8 oz 0   • [DISCONTINUED] fluticasone (FLONASE) 50 MCG/ACT nasal spray 2 sprays into the nostril(s) as directed by provider Daily. 1 bottle 0     Facility-Administered Encounter Medications as of 11/27/2018   Medication Dose Route Frequency Provider Last Rate Last Dose   • cyanocobalamin injection 1,000 mcg  1,000 mcg Intramuscular Q28 Days Bhakti Tran APRN       • [DISCONTINUED] triamcinolone acetonide (KENALOG-40) injection 80 mg  80 mg Intramuscular Once Bhakti Tran APRN           Reviewed use of high risk medication in the elderly: yes  Reviewed for potential of harmful drug interactions in the elderly: yes    Follow Up:  Return in about 6 months (around 5/27/2019), or if symptoms worsen or fail to improve, for or sooner as needed, Next scheduled follow up.     An After Visit Summary and PPPS with all of these plans were given to the patient.

## 2018-11-28 DIAGNOSIS — R73.09 ELEVATED GLUCOSE: Primary | ICD-10-CM

## 2018-11-28 LAB — HBA1C MFR BLD: 6.1 % (ref 4–5.6)

## 2018-12-06 ENCOUNTER — OFFICE VISIT (OUTPATIENT)
Dept: GASTROENTEROLOGY | Facility: CLINIC | Age: 73
End: 2018-12-06

## 2018-12-06 VITALS
OXYGEN SATURATION: 98 % | HEIGHT: 63 IN | HEART RATE: 83 BPM | DIASTOLIC BLOOD PRESSURE: 52 MMHG | SYSTOLIC BLOOD PRESSURE: 142 MMHG | BODY MASS INDEX: 20.8 KG/M2 | WEIGHT: 117.4 LBS

## 2018-12-06 DIAGNOSIS — R13.10 DYSPHAGIA, UNSPECIFIED TYPE: Primary | ICD-10-CM

## 2018-12-06 PROCEDURE — 99214 OFFICE O/P EST MOD 30 MIN: CPT | Performed by: NURSE PRACTITIONER

## 2018-12-06 RX ORDER — DEXTROSE AND SODIUM CHLORIDE 5; .45 G/100ML; G/100ML
30 INJECTION, SOLUTION INTRAVENOUS CONTINUOUS PRN
Status: CANCELLED | OUTPATIENT
Start: 2018-12-12

## 2018-12-06 RX ORDER — OMEPRAZOLE 40 MG/1
40 CAPSULE, DELAYED RELEASE ORAL DAILY
Qty: 30 CAPSULE | Refills: 11 | Status: SHIPPED | OUTPATIENT
Start: 2018-12-06 | End: 2019-08-08

## 2018-12-06 NOTE — PATIENT INSTRUCTIONS
Dysphagia  Dysphagia is trouble swallowing. This condition occurs when solids and liquids stick in a person's throat on the way down to the stomach, or when food takes longer to get to the stomach. You may have problems swallowing food, liquids, or both. You may also have pain while trying to swallow. It may take you more time and effort to swallow something.  What are the causes?  This condition is caused by:  · Problems with the muscles. They may make it difficult for you to move food and liquids through the tube that connects your mouth to your stomach (esophagus). You may have ulcers, scar tissue, or inflammation that blocks the normal passage of food and liquids. Causes of these problems include:  ? Acid reflux from your stomach into your esophagus (gastroesophageal reflux).  ? Infections.  ? Radiation treatment for cancer.  ? Medicines taken without enough fluids to wash them down into your stomach.  · Nerve problems. These prevent signals from being sent to the muscles of your esophagus to squeeze (contract) and move what you swallow down to your stomach.  · Globus pharyngeus. This is a common problem that involves feeling like something is stuck in the throat or a sense of trouble with swallowing even though nothing is wrong with the swallowing passages.  · Stroke. This can affect the nerves and make it difficult to swallow.  · Certain conditions, such as cerebral palsy or Parkinson disease.    What are the signs or symptoms?  Common symptoms of this condition include:  · A feeling that solids or liquids are stuck in your throat on the way down to the stomach.  · Food taking too long to get to the stomach.    Other symptoms include:  · Food moving back from your stomach to your mouth (regurgitation).  · Noises coming from your throat.  · Chest discomfort with swallowing.  · A feeling of fullness when swallowing.  · Drooling, especially when the throat is blocked.  · Pain while  swallowing.  · Heartburn.  · Coughing or gagging while trying to swallow.    How is this diagnosed?  This condition is diagnosed by:  · Barium X-ray. In this test, you swallow a white substance (contrast medium)that sticks to the inside of your esophagus. X-ray images are then taken.  · Endoscopy. In this test, a flexible telescope is inserted down your throat to look at your esophagus and your stomach.  · CT scans and MRI.    How is this treated?  Treatment for dysphagia depends on the cause of the condition:  · If the dysphagia is caused by acid reflux or infection, medicines may be used. They may include antibiotics and heartburn medicines.  · If the dysphagia is caused by problems with your muscles, swallowing therapy may be used to help you strengthen your swallowing muscles. You may have to do specific exercises to strengthen the muscles or stretch them.  · If the dysphagia is caused by a blockage or mass, procedures to remove the blockage may be done. You may need surgery and a feeding tube.    You may need to make diet changes. Ask your health care provider for specific instructions.  Follow these instructions at home:  Eating and drinking  · Try to eat soft food that is easier to swallow.  · Follow any diet changes as told by your health care provider.  · Cut your food into small pieces and eat slowly.  · Eat and drink only when you are sitting upright.  · Do not drink alcohol or caffeine. If you need help quitting, ask your health care provider.  General instructions  · Check your weight every day to make sure you are not losing weight.  · Take over-the-counter and prescription medicines only as told by your health care provider.  · If you were prescribed an antibiotic medicine, take it as told by your health care provider. Do not stop taking the antibiotic even if you start to feel better.  · Do not use any products that contain nicotine or tobacco, such as cigarettes and e-cigarettes. If you need help  quitting, ask your health care provider.  · Keep all follow-up visits as told by your health care provider. This is important.  Contact a health care provider if:  · You lose weight because you cannot swallow.  · You cough when you drink liquids (aspiration).  · You cough up partially digested food.  Get help right away if:  · You cannot swallow your saliva.  · You have shortness of breath or a fever, or both.  · You have a hoarse voice and also have trouble swallowing.  Summary  · Dysphagia is trouble swallowing. This condition occurs when solids and liquids stick in a person's throat on the way down to the stomach, or when food takes longer to get to the stomach.  · Dysphagia has many possible causes and symptoms.  · Treatment for dysphagia depends on the cause of the condition.  This information is not intended to replace advice given to you by your health care provider. Make sure you discuss any questions you have with your health care provider.  Document Released: 12/15/2001 Document Revised: 12/07/2017 Document Reviewed: 12/07/2017  [employer] Patient Education © 2017 [employer]

## 2018-12-06 NOTE — H&P (VIEW-ONLY)
Chief Complaint   Patient presents with   • Difficulty Swallowing       Subjective    Kimi Buck is a 73 y.o. female. she is here today   History of Present Illness  73-year-old female presents to discuss dysphagia along with burning in her mouth for the last 6 weeks.  She has tried Magic mouthwash, triamcinolone paste, seen a dentist ×2 and have her thyroid checked.  Reports at onset of symptoms she had a cold and reports she was taking Tums routinely at that time.  She denies any nausea or abdominal pain.  States anytime she eats or takes pills she gets choked very easily has dry mouth and trouble swallowing.  Denies any significant GI history reports she had screening colonoscopy in  which was normal.  She has lost 9 pounds since onset.  Reports she is only been able to eat soup.  States she is still hungry.  Plan; we'll start patient on Prilosec discussed with patient's symptoms may be related to severe uncontrolled reflux.  We'll schedule patient for EGD due to possible peptic stricture.  Follow-up after test return to office sooner if needed.       The following portions of the patient's history were reviewed and updated as appropriate:   Past Medical History:   Diagnosis Date   • Heart murmur    • Urinary tract infection    • Visual impairment      Past Surgical History:   Procedure Laterality Date   • COLONOSCOPY     • EYE SURGERY     • VARICOSE VEIN SURGERY       Family History   Problem Relation Age of Onset   • Cancer Mother    • COPD Mother    • Vision loss Mother    • Vision loss Father      OB History      Para Term  AB Living    3 3 3          SAB TAB Ectopic Molar Multiple Live Births                       Current Outpatient Medications   Medication Sig Dispense Refill   • omeprazole (priLOSEC) 40 MG capsule Take 1 capsule by mouth Daily. 30 capsule 11   • triamcinolone (KENALOG) 0.1 % paste Apply  to teeth 3 (Three) Times a Day. Apply to gums, buccal areas tid prn 5 g 1      "    Current Facility-Administered Medications   Medication Dose Route Frequency Provider Last Rate Last Dose   • cyanocobalamin injection 1,000 mcg  1,000 mcg Intramuscular Q28 Days Bhakti Tran APRN         Allergies   Allergen Reactions   • Penicillins Itching     Social History     Socioeconomic History   • Marital status: Single     Spouse name: Not on file   • Number of children: Not on file   • Years of education: Not on file   • Highest education level: Not on file   Tobacco Use   • Smoking status: Former Smoker   • Smokeless tobacco: Never Used   Substance and Sexual Activity   • Alcohol use: No   • Drug use: No   • Sexual activity: Yes       Review of Systems  Review of Systems   Constitutional: Positive for unexpected weight change ( down 9 pounds in last month has appetite but eating less due to painful swallowing and moiuth \"burning\"). Negative for activity change, appetite change, chills, diaphoresis, fatigue and fever.   HENT: Positive for trouble swallowing. Negative for sore throat.    Respiratory: Negative for shortness of breath.    Gastrointestinal: Negative for abdominal distention, abdominal pain, anal bleeding, blood in stool, constipation, diarrhea, nausea, rectal pain and vomiting.   Musculoskeletal: Negative for arthralgias.   Skin: Negative for pallor.   Neurological: Negative for light-headedness.        /52 (BP Location: Left arm)   Pulse 83   Ht 160 cm (63\")   Wt 53.3 kg (117 lb 6.4 oz)   SpO2 98%   BMI 20.80 kg/m²     Objective    Physical Exam   Constitutional: She is oriented to person, place, and time. She appears well-developed and well-nourished. She is cooperative. No distress.   HENT:   Head: Normocephalic and atraumatic.   Neck: Normal range of motion. Neck supple. No thyromegaly present.   Cardiovascular: Normal rate, regular rhythm and normal heart sounds.   Pulmonary/Chest: Effort normal and breath sounds normal. She has no wheezes. She has no rhonchi. She has " no rales.   Abdominal: Soft. Normal appearance and bowel sounds are normal. She exhibits no distension. There is no hepatosplenomegaly. There is no tenderness. There is no rigidity and no guarding. No hernia.   Lymphadenopathy:     She has no cervical adenopathy.   Neurological: She is alert and oriented to person, place, and time.   Skin: Skin is warm, dry and intact. No rash noted. No pallor.   Psychiatric: She has a normal mood and affect. Her speech is normal.     Lab on 11/27/2018   Component Date Value Ref Range Status   • Color, UA 11/27/2018 Yellow  Yellow, Straw Final   • Appearance, UA 11/27/2018 Clear  Clear Final   • pH, UA 11/27/2018 5.5  5.5 - 8.0 Final   • Specific Gravity, UA 11/27/2018 >=1.030  1.005 - 1.030 Final   • Glucose, UA 11/27/2018 Negative  Negative Final   • Ketones, UA 11/27/2018 Negative  Negative Final   • Bilirubin, UA 11/27/2018 Negative  Negative Final   • Blood, UA 11/27/2018 Negative  Negative Final   • Protein, UA 11/27/2018 Negative  Negative Final   • Leuk Esterase, UA 11/27/2018 Trace* Negative Final   • Nitrite, UA 11/27/2018 Negative  Negative Final   • Urobilinogen, UA 11/27/2018 0.2 E.U./dL  0.2 - 1.0 E.U./dL Final   • Glucose 11/27/2018 167* 74 - 99 mg/dL Final   • BUN 11/27/2018 14  7 - 17 mg/dL Final   • Creatinine 11/27/2018 0.74  0.52 - 1.04 mg/dL Final   • Sodium 11/27/2018 142  137 - 145 mmol/L Final   • Potassium 11/27/2018 4.2  3.4 - 5.0 mmol/L Final   • Chloride 11/27/2018 105  98 - 107 mmol/L Final   • CO2 11/27/2018 29.0  22.0 - 30.0 mmol/L Final   • Calcium 11/27/2018 9.8  8.4 - 10.2 mg/dL Final   • Total Protein 11/27/2018 6.9  6.3 - 8.2 g/dL Final   • Albumin 11/27/2018 4.60  3.50 - 5.00 g/dL Final   • ALT (SGPT) 11/27/2018 17  <=35 U/L Final   • AST (SGOT) 11/27/2018 23  14 - 36 U/L Final   • Alkaline Phosphatase 11/27/2018 62  38 - 126 U/L Final   • Total Bilirubin 11/27/2018 0.7  0.2 - 1.3 mg/dL Final   • eGFR Non  Amer 11/27/2018 77  39 - 90  mL/min/1.73 Final   • Globulin 11/27/2018 2.3  2.3 - 3.5 gm/dL Final   • A/G Ratio 11/27/2018 2.0* 1.1 - 1.8 g/dL Final   • BUN/Creatinine Ratio 11/27/2018 18.9  7.0 - 25.0 Final   • Anion Gap 11/27/2018 8.0  5.0 - 15.0 mmol/L Final   • Vitamin B-12 11/27/2018 509  239 - 931 pg/mL Final   • TSH 11/27/2018 2.620  0.460 - 4.680 mIU/mL Final   • Total Cholesterol 11/27/2018 250* 150 - 200 mg/dL Final   • Triglycerides 11/27/2018 146  <=150 mg/dL Final   • HDL Cholesterol 11/27/2018 55  40 - 59 mg/dL Final   • LDL Cholesterol  11/27/2018 166* <=100 mg/dL Final   • VLDL Cholesterol 11/27/2018 29.2  mg/dL Final   • LDL/HDL Ratio 11/27/2018 3.01  0.00 - 3.22 Final   • WBC 11/27/2018 5.30  3.20 - 9.80 10*3/mm3 Final   • RBC 11/27/2018 4.28  3.77 - 5.16 10*6/mm3 Final   • Hemoglobin 11/27/2018 12.4  12.0 - 15.5 g/dL Final   • Hematocrit 11/27/2018 37.9  35.0 - 45.0 % Final   • MCV 11/27/2018 88.6  80.0 - 98.0 fL Final   • MCH 11/27/2018 29.0  26.5 - 34.0 pg Final   • MCHC 11/27/2018 32.7  31.4 - 36.0 g/dL Final   • RDW 11/27/2018 13.5  11.5 - 14.5 % Final   • RDW-SD 11/27/2018 41.9  36.4 - 46.3 fl Final   • MPV 11/27/2018 10.6  8.0 - 12.0 fL Final   • Platelets 11/27/2018 288  150 - 450 10*3/mm3 Final   • Neutrophil % 11/27/2018 65.0  37.0 - 80.0 % Final   • Lymphocyte % 11/27/2018 23.0  10.0 - 50.0 % Final   • Monocyte % 11/27/2018 10.9  0.0 - 12.0 % Final   • Eosinophil % 11/27/2018 0.9  0.0 - 7.0 % Final   • Basophil % 11/27/2018 0.2  0.0 - 2.0 % Final   • Neutrophils, Absolute 11/27/2018 3.44  2.00 - 8.60 10*3/mm3 Final   • Lymphocytes, Absolute 11/27/2018 1.22  0.60 - 4.20 10*3/mm3 Final   • Monocytes, Absolute 11/27/2018 0.58  0.00 - 0.90 10*3/mm3 Final   • Eosinophils, Absolute 11/27/2018 0.05  0.00 - 0.70 10*3/mm3 Final   • Basophils, Absolute 11/27/2018 0.01  0.00 - 0.20 10*3/mm3 Final   • RBC, UA 11/27/2018 None Seen  None Seen /HPF Final   • WBC, UA 11/27/2018 3-5* None Seen /HPF Final   • Bacteria, UA 11/27/2018  Trace* None Seen /HPF Final   • Squamous Epithelial Cells, UA 11/27/2018 3-6* None Seen, 0-2 /HPF Final   • Hyaline Casts, UA 11/27/2018 None Seen  None Seen /LPF Final   • Mucus, UA 11/27/2018 Moderate/2+* None Seen, Trace /HPF Final   • Methodology 11/27/2018 Manual Light Microscopy   Final   • Hemoglobin A1C 11/27/2018 6.1* 4 - 5.6 % Final     Assessment/Plan      1. Dysphagia, unspecified type    .       Orders placed during this encounter include:  Orders Placed This Encounter   Procedures   • Follow Anesthesia Guidelines / Standing Orders     Standing Status:   Future       ESOPHAGOGASTRODUODENOSCOPY possible dilation urgent (N/A)    Review and/or summary of lab tests, radiology, procedures, medications. Review and summary of old records and obtaining of history. The risks and benefits of my recommendations, as well as other treatment options were discussed with the patient today. Questions were answered.    New Medications Ordered This Visit   Medications   • omeprazole (priLOSEC) 40 MG capsule     Sig: Take 1 capsule by mouth Daily.     Dispense:  30 capsule     Refill:  11       Follow-up: Return in about 4 weeks (around 1/3/2019).          This document has been electronically signed by TESFAYE Marquez on December 6, 2018 11:24 AM             Results for orders placed or performed in visit on 11/27/18   Urinalysis, Microscopic Only - Urine, Clean Catch   Result Value Ref Range    RBC, UA None Seen None Seen /HPF    WBC, UA 3-5 (A) None Seen /HPF    Bacteria, UA Trace (A) None Seen /HPF    Squamous Epithelial Cells, UA 3-6 (A) None Seen, 0-2 /HPF    Hyaline Casts, UA None Seen None Seen /LPF    Mucus, UA Moderate/2+ (A) None Seen, Trace /HPF    Methodology Manual Light Microscopy    Urinalysis With Culture If Indicated - Urine, Clean Catch   Result Value Ref Range    Color, UA Yellow Yellow, Straw    Appearance, UA Clear Clear    pH, UA 5.5 5.5 - 8.0    Specific Gravity, UA >=1.030 1.005 - 1.030     Glucose, UA Negative Negative    Ketones, UA Negative Negative    Bilirubin, UA Negative Negative    Blood, UA Negative Negative    Protein, UA Negative Negative    Leuk Esterase, UA Trace (A) Negative    Nitrite, UA Negative Negative    Urobilinogen, UA 0.2 E.U./dL 0.2 - 1.0 E.U./dL   CBC Auto Differential   Result Value Ref Range    WBC 5.30 3.20 - 9.80 10*3/mm3    RBC 4.28 3.77 - 5.16 10*6/mm3    Hemoglobin 12.4 12.0 - 15.5 g/dL    Hematocrit 37.9 35.0 - 45.0 %    MCV 88.6 80.0 - 98.0 fL    MCH 29.0 26.5 - 34.0 pg    MCHC 32.7 31.4 - 36.0 g/dL    RDW 13.5 11.5 - 14.5 %    RDW-SD 41.9 36.4 - 46.3 fl    MPV 10.6 8.0 - 12.0 fL    Platelets 288 150 - 450 10*3/mm3    Neutrophil % 65.0 37.0 - 80.0 %    Lymphocyte % 23.0 10.0 - 50.0 %    Monocyte % 10.9 0.0 - 12.0 %    Eosinophil % 0.9 0.0 - 7.0 %    Basophil % 0.2 0.0 - 2.0 %    Neutrophils, Absolute 3.44 2.00 - 8.60 10*3/mm3    Lymphocytes, Absolute 1.22 0.60 - 4.20 10*3/mm3    Monocytes, Absolute 0.58 0.00 - 0.90 10*3/mm3    Eosinophils, Absolute 0.05 0.00 - 0.70 10*3/mm3    Basophils, Absolute 0.01 0.00 - 0.20 10*3/mm3   TSH   Result Value Ref Range    TSH 2.620 0.460 - 4.680 mIU/mL   Hemoglobin A1c   Result Value Ref Range    Hemoglobin A1C 6.1 (H) 4 - 5.6 %   Vitamin B12   Result Value Ref Range    Vitamin B-12 509 239 - 931 pg/mL   Lipid panel   Result Value Ref Range    Total Cholesterol 250 (H) 150 - 200 mg/dL    Triglycerides 146 <=150 mg/dL    HDL Cholesterol 55 40 - 59 mg/dL    LDL Cholesterol  166 (H) <=100 mg/dL    VLDL Cholesterol 29.2 mg/dL    LDL/HDL Ratio 3.01 0.00 - 3.22   Comprehensive metabolic panel   Result Value Ref Range    Glucose 167 (H) 74 - 99 mg/dL    BUN 14 7 - 17 mg/dL    Creatinine 0.74 0.52 - 1.04 mg/dL    Sodium 142 137 - 145 mmol/L    Potassium 4.2 3.4 - 5.0 mmol/L    Chloride 105 98 - 107 mmol/L    CO2 29.0 22.0 - 30.0 mmol/L    Calcium 9.8 8.4 - 10.2 mg/dL    Total Protein 6.9 6.3 - 8.2 g/dL    Albumin 4.60 3.50 - 5.00 g/dL    ALT  (SGPT) 17 <=35 U/L    AST (SGOT) 23 14 - 36 U/L    Alkaline Phosphatase 62 38 - 126 U/L    Total Bilirubin 0.7 0.2 - 1.3 mg/dL    eGFR Non African Amer 77 39 - 90 mL/min/1.73    Globulin 2.3 2.3 - 3.5 gm/dL    A/G Ratio 2.0 (H) 1.1 - 1.8 g/dL    BUN/Creatinine Ratio 18.9 7.0 - 25.0    Anion Gap 8.0 5.0 - 15.0 mmol/L   Results for orders placed or performed during the hospital encounter of 01/15/18   POCT Urine Micro   Result Value Ref Range    Leukocytes, UA     POCT Urinalysis (automated dipstick)   Result Value Ref Range    Color Yellow Yellow, Straw, Dark Yellow, [place]    Clarity, UA Clear Clear    Glucose, UA Negative Negative, 1000 mg/dL (3+) mg/dL    Bilirubin Negative Negative    Ketones, UA Negative Negative    Specific Gravity  1.010 1.005 - 1.030    Blood, UA Negative Negative    pH, Urine 6.0 5.0 - 8.0    Protein, POC Negative Negative mg/dL    Urobilinogen, UA Normal Normal    Leukocytes Small (1+) (A) Negative    Nitrite, UA Negative Negative   Results for orders placed or performed in visit on 06/06/17   CBC Auto Differential   Result Value Ref Range    WBC 6.29 3.20 - 9.80 10*3/mm3    RBC 4.83 3.77 - 5.16 10*6/mm3    Hemoglobin 14.8 12.0 - 15.5 g/dL    Hematocrit 42.8 35.0 - 45.0 %    MCV 88.6 80.0 - 98.0 fL    MCH 30.6 26.5 - 34.0 pg    MCHC 34.6 31.4 - 36.0 g/dL    RDW 12.7 11.5 - 14.5 %    RDW-SD 40.8 36.4 - 46.3 fl    MPV 10.9 8.0 - 12.0 fL    Platelets 253 150 - 450 10*3/mm3    Neutrophil % 74.8 37.0 - 80.0 %    Lymphocyte % 16.4 10.0 - 50.0 %    Monocyte % 7.6 0.0 - 12.0 %    Eosinophil % 0.6 0.0 - 7.0 %    Basophil % 0.3 0.0 - 2.0 %    Immature Grans % 0.3 0.0 - 0.5 %    Neutrophils, Absolute 4.70 2.00 - 8.60 10*3/mm3    Lymphocytes, Absolute 1.03 0.60 - 4.20 10*3/mm3    Monocytes, Absolute 0.48 0.00 - 0.90 10*3/mm3    Eosinophils, Absolute 0.04 0.00 - 0.70 10*3/mm3    Basophils, Absolute 0.02 0.00 - 0.20 10*3/mm3    Immature Grans, Absolute 0.02 0.00 - 0.02 10*3/mm3     *Note: Due to a large  number of results and/or encounters for the requested time period, some results have not been displayed. A complete set of results can be found in Results Review.

## 2018-12-12 ENCOUNTER — HOSPITAL ENCOUNTER (OUTPATIENT)
Facility: HOSPITAL | Age: 73
Setting detail: HOSPITAL OUTPATIENT SURGERY
Discharge: HOME | End: 2018-12-12
Attending: INTERNAL MEDICINE | Admitting: INTERNAL MEDICINE

## 2018-12-12 ENCOUNTER — ANESTHESIA (OUTPATIENT)
Dept: GASTROENTEROLOGY | Facility: HOSPITAL | Age: 73
End: 2018-12-12

## 2018-12-12 ENCOUNTER — ANESTHESIA EVENT (OUTPATIENT)
Dept: GASTROENTEROLOGY | Facility: HOSPITAL | Age: 73
End: 2018-12-12

## 2018-12-12 VITALS
RESPIRATION RATE: 18 BRPM | HEIGHT: 63 IN | DIASTOLIC BLOOD PRESSURE: 62 MMHG | HEART RATE: 79 BPM | WEIGHT: 117.13 LBS | BODY MASS INDEX: 20.75 KG/M2 | TEMPERATURE: 97.8 F | OXYGEN SATURATION: 99 % | SYSTOLIC BLOOD PRESSURE: 133 MMHG

## 2018-12-12 DIAGNOSIS — R13.10 DYSPHAGIA, UNSPECIFIED TYPE: ICD-10-CM

## 2018-12-12 PROCEDURE — 88305 TISSUE EXAM BY PATHOLOGIST: CPT | Performed by: INTERNAL MEDICINE

## 2018-12-12 PROCEDURE — 43248 EGD GUIDE WIRE INSERTION: CPT | Performed by: INTERNAL MEDICINE

## 2018-12-12 PROCEDURE — 88305 TISSUE EXAM BY PATHOLOGIST: CPT | Performed by: PATHOLOGY

## 2018-12-12 PROCEDURE — 25010000002 PROPOFOL 10 MG/ML EMULSION: Performed by: NURSE ANESTHETIST, CERTIFIED REGISTERED

## 2018-12-12 PROCEDURE — 43239 EGD BIOPSY SINGLE/MULTIPLE: CPT | Performed by: INTERNAL MEDICINE

## 2018-12-12 RX ORDER — LIDOCAINE HYDROCHLORIDE 10 MG/ML
INJECTION, SOLUTION INFILTRATION; PERINEURAL AS NEEDED
Status: DISCONTINUED | OUTPATIENT
Start: 2018-12-12 | End: 2018-12-12 | Stop reason: SURG

## 2018-12-12 RX ORDER — DEXTROSE AND SODIUM CHLORIDE 5; .45 G/100ML; G/100ML
30 INJECTION, SOLUTION INTRAVENOUS CONTINUOUS PRN
Status: DISCONTINUED | OUTPATIENT
Start: 2018-12-12 | End: 2018-12-12 | Stop reason: HOSPADM

## 2018-12-12 RX ORDER — PROPOFOL 10 MG/ML
VIAL (ML) INTRAVENOUS AS NEEDED
Status: DISCONTINUED | OUTPATIENT
Start: 2018-12-12 | End: 2018-12-12 | Stop reason: SURG

## 2018-12-12 RX ADMIN — LIDOCAINE HYDROCHLORIDE 100 MG: 10 INJECTION, SOLUTION INFILTRATION; PERINEURAL at 08:42

## 2018-12-12 RX ADMIN — PROPOFOL 50 MG: 10 INJECTION, EMULSION INTRAVENOUS at 08:42

## 2018-12-12 RX ADMIN — PROPOFOL 50 MG: 10 INJECTION, EMULSION INTRAVENOUS at 08:46

## 2018-12-12 RX ADMIN — DEXTROSE AND SODIUM CHLORIDE 30 ML/HR: 5; 450 INJECTION, SOLUTION INTRAVENOUS at 07:50

## 2018-12-12 RX ADMIN — PROPOFOL 50 MG: 10 INJECTION, EMULSION INTRAVENOUS at 08:39

## 2018-12-12 NOTE — ANESTHESIA POSTPROCEDURE EVALUATION
Patient: Kimi Buck    Procedure Summary     Date:  12/12/18 Room / Location:  [facility] ENDOSCOPY 1 / [facility] ENDOSCOPY    Anesthesia Start:  0829 Anesthesia Stop:  0853    Procedure:  ESOPHAGOGASTRODUODENOSCOPY possible dilation urgent (N/A ) Diagnosis:       Dysphagia, unspecified type      (Dysphagia, unspecified type [R13.10])    Surgeon:  Jam Mccracken MD Provider:  Prerna Parkinson CRNA    Anesthesia Type:  MAC ASA Status:  2          Anesthesia Type: MAC  Last vitals  BP   169/59 (12/12/18 0740)   Temp   97 °F (36.1 °C) (12/12/18 0740)   Pulse       Resp   20 (12/12/18 0740)     SpO2   97 % (12/12/18 0740)     Post Anesthesia Care and Evaluation    Patient location during evaluation: bedside  Patient participation: waiting for patient participation  Level of consciousness: sleepy but conscious  Pain score: 0  Pain management: adequate  Airway patency: patent  Anesthetic complications: No anesthetic complications  PONV Status: none  Cardiovascular status: acceptable  Respiratory status: acceptable  Hydration status: acceptable

## 2018-12-12 NOTE — ANESTHESIA PREPROCEDURE EVALUATION
Anesthesia Evaluation     NPO Solid Status: > 8 hours  NPO Liquid Status: > 8 hours           Airway   Mallampati: II  TM distance: >3 FB  Neck ROM: full  No difficulty expected  Dental      Pulmonary - normal exam   Cardiovascular - normal exam    (+) valvular problems/murmurs,       Neuro/Psych  GI/Hepatic/Renal/Endo      Musculoskeletal     Abdominal  - normal exam   Substance History      OB/GYN          Other                        Anesthesia Plan    ASA 2     MAC     intravenous induction   Anesthetic plan, all risks, benefits, and alternatives have been provided, discussed and informed consent has been obtained with: patient.

## 2018-12-13 LAB
LAB AP CASE REPORT: NORMAL
PATH REPORT.FINAL DX SPEC: NORMAL
PATH REPORT.GROSS SPEC: NORMAL

## 2018-12-13 NOTE — ADDENDUM NOTE
Addendum  created 12/12/18 1911 by Nuno Jordan MD    Allergies reviewed, Medication List reviewed, Problem List reviewed

## 2018-12-13 NOTE — ADDENDUM NOTE
Addendum  created 12/12/18 1912 by Nuno Jordan MD    Review and Sign - Ready for Procedure, Review and Sign - Signed, Sign clinical note

## 2018-12-19 ENCOUNTER — OFFICE VISIT (OUTPATIENT)
Dept: GASTROENTEROLOGY | Facility: CLINIC | Age: 73
End: 2018-12-19

## 2018-12-19 VITALS
BODY MASS INDEX: 20.62 KG/M2 | SYSTOLIC BLOOD PRESSURE: 138 MMHG | HEART RATE: 130 BPM | DIASTOLIC BLOOD PRESSURE: 62 MMHG | WEIGHT: 116.4 LBS | HEIGHT: 63 IN | OXYGEN SATURATION: 98 %

## 2018-12-19 DIAGNOSIS — K22.2 STRICTURE AND STENOSIS OF ESOPHAGUS: Primary | ICD-10-CM

## 2018-12-19 DIAGNOSIS — K29.00 ACUTE GASTRITIS WITHOUT HEMORRHAGE, UNSPECIFIED GASTRITIS TYPE: ICD-10-CM

## 2018-12-19 PROCEDURE — 99213 OFFICE O/P EST LOW 20 MIN: CPT | Performed by: NURSE PRACTITIONER

## 2018-12-19 NOTE — PATIENT INSTRUCTIONS
Esophageal Dilatation  Esophageal dilatation is a procedure to open a blocked or narrowed part of the esophagus. The esophagus is the long tube in your throat that carries food and liquid from your mouth to your stomach. The procedure is also called esophageal dilation.  You may need this procedure if you have a buildup of scar tissue in your esophagus that makes it difficult, painful, or even impossible to swallow. This can be caused by gastroesophageal reflux disease (GERD). In rare cases, people need this procedure because they have cancer of the esophagus or a problem with the way food moves through the esophagus. Sometimes you may need to have another dilatation to enlarge the opening of the esophagus gradually.  Tell a health care provider about:  · Any allergies you have.  · All medicines you are taking, including vitamins, herbs, eye drops, creams, and over-the-counter medicines.  · Any problems you or family members have had with anesthetic medicines.  · Any blood disorders you have.  · Any surgeries you have had.  · Any medical conditions you have.  · Any antibiotic medicines you are required to take before dental procedures.  What are the risks?  Generally, this is a safe procedure. However, problems can occur and include:  · Bleeding from a tear in the lining of the esophagus.  · A hole (perforation) in the esophagus.    What happens before the procedure?  · Do not eat or drink anything after midnight on the night before the procedure or as directed by your health care provider.  · Ask your health care provider about changing or stopping your regular medicines. This is especially important if you are taking diabetes medicines or blood thinners.  · Plan to have someone take you home after the procedure.  What happens during the procedure?  · You will be given a medicine that makes you relaxed and sleepy (sedative).  · A medicine may be sprayed or gargled to numb the back of the throat.  · Your health care  provider can use various instruments to do an esophageal dilatation. During the procedure, the instrument used will be placed in your mouth and passed down into your esophagus. Options include:  ? Simple dilators. This instrument is carefully placed in the esophagus to stretch it.  ? Guided wire bougies. In this method, a flexible tube (endoscope) is used to insert a wire into the esophagus. The dilator is passed over this wire to enlarge the esophagus. Then the wire is removed.  ? Balloon dilators. An endoscope with a small balloon at the end is passed down into the esophagus. Inflating the balloon gently stretches the esophagus and opens it up.  What happens after the procedure?  · Your blood pressure, heart rate, breathing rate, and blood oxygen level will be monitored often until the medicines you were given have worn off.  · Your throat may feel slightly sore and will probably still feel numb. This will improve slowly over time.  · You will not be allowed to eat or drink until the throat numbness has resolved.  · If this is a same-day procedure, you may be allowed to go home once you have been able to drink, urinate, and sit on the edge of the bed without nausea or dizziness.  · If this is a same-day procedure, you should have a friend or family member with you for the next 24 hours after the procedure.  This information is not intended to replace advice given to you by your health care provider. Make sure you discuss any questions you have with your health care provider.  Document Released: 02/08/2007 Document Revised: 05/25/2017 Document Reviewed: 04/29/2015  [employer] Patient Education © 2018 [employer]

## 2018-12-19 NOTE — PROGRESS NOTES
Chief Complaint   Patient presents with   • Difficulty Swallowing       Subjective    Kimi Buck is a 73 y.o. female. she is here today for follow-up.    History of Present Illness  73-year-old female presents to discuss EGD results and dysphagia.  Reports swallowing is much better though she still has frequent burning in the back of her throat and mouth that has been ongoing.  Has tried multiple regimens including Magic mouthwash, triamcinolone paste, followed up with a dentist twice and had her thyroid checked.  She denies any nausea, reflux symptoms or abdominal pain.  Denies any changes in bowel habits or blood within her stool.  She is down another pound from last visit.  Reports decreased appetite.  EGD noted benign appearing stricture dilated 54 Sinhala, gastritis in the stomach duodenum appeared normal areas antrum of stomach biopsy noted reactive gastropathy.  Plan; recommended she continue Prilosec daily.  Follow up with primary care provider return to GI office his symptoms of dysphagia recur or worsen.  Standard antireflux measures recommended.       The following portions of the patient's history were reviewed and updated as appropriate:   Past Medical History:   Diagnosis Date   • Heart murmur    • Urinary tract infection    • Visual impairment      Past Surgical History:   Procedure Laterality Date   • COLONOSCOPY     • ENDOSCOPY N/A 2018    Procedure: ESOPHAGOGASTRODUODENOSCOPY possible dilation urgent;  Surgeon: Jam Mccracken MD;  Location: [facility] ENDOSCOPY;  Service: Gastroenterology   • EYE SURGERY     • VARICOSE VEIN SURGERY       Family History   Problem Relation Age of Onset   • Cancer Mother    • COPD Mother    • Vision loss Mother    • Vision loss Father      OB History      Para Term  AB Living    3 3 3          SAB TAB Ectopic Molar Multiple Live Births                       Current Outpatient Medications   Medication Sig Dispense Refill   • omeprazole (priLOSEC)  "40 MG capsule Take 1 capsule by mouth Daily. 30 capsule 11   • triamcinolone (KENALOG) 0.1 % paste Apply  to teeth 3 (Three) Times a Day. Apply to gums, buccal areas tid prn 5 g 1     Current Facility-Administered Medications   Medication Dose Route Frequency Provider Last Rate Last Dose   • cyanocobalamin injection 1,000 mcg  1,000 mcg Intramuscular Q28 Days Bhakti Tran APRN         Allergies   Allergen Reactions   • Penicillins Itching     Social History     Socioeconomic History   • Marital status:      Spouse name: Not on file   • Number of children: Not on file   • Years of education: Not on file   • Highest education level: Not on file   Tobacco Use   • Smoking status: Former Smoker   • Smokeless tobacco: Never Used   Substance and Sexual Activity   • Alcohol use: No   • Drug use: No   • Sexual activity: Yes       Review of Systems  Review of Systems   Constitutional: Negative for activity change, appetite change, chills, diaphoresis, fatigue, fever and unexpected weight change.   HENT: Positive for trouble swallowing (improved). Negative for sore throat (burning in mouth/back of throat ).    Respiratory: Negative for shortness of breath.    Gastrointestinal: Negative for abdominal distention, abdominal pain, anal bleeding, blood in stool, constipation, diarrhea, nausea, rectal pain and vomiting.   Musculoskeletal: Negative for arthralgias.   Skin: Negative for pallor.   Neurological: Negative for light-headedness.        /62 (BP Location: Left arm)   Pulse (!) 130   Ht 160 cm (63\")   Wt 52.8 kg (116 lb 6.4 oz)   SpO2 98%   BMI 20.62 kg/m²     Objective    Physical Exam   Constitutional: She is oriented to person, place, and time. She appears well-developed and well-nourished. She is cooperative. No distress.   HENT:   Head: Normocephalic and atraumatic.   Neck: Normal range of motion. Neck supple. No thyromegaly present.   Cardiovascular: Normal rate, regular rhythm and normal heart " sounds.   Pulmonary/Chest: Effort normal and breath sounds normal. She has no wheezes. She has no rhonchi. She has no rales.   Abdominal: Soft. Normal appearance and bowel sounds are normal. She exhibits no shifting dullness and no distension. There is no hepatosplenomegaly. There is no tenderness. There is no rigidity and no guarding. No hernia.   Lymphadenopathy:     She has no cervical adenopathy.   Neurological: She is alert and oriented to person, place, and time.   Skin: Skin is warm, dry and intact. No rash noted. No pallor.   Psychiatric: She has a normal mood and affect. Her speech is normal.     Admission on 12/12/2018, Discharged on 12/12/2018   Component Date Value Ref Range Status   • Case Report 12/12/2018    Final                    Value:Surgical Pathology Report                         Case: [id number]                                  Authorizing Provider:  Jam Mccracken MD        Collected:           12/12/2018 08:49 AM          Ordering Location:     [facility]             Received:            12/12/2018 09:19 AM                                 [place] [facility]                                                     Pathologist:           Ranjit Snyder MD                                                         Specimen:    Gastric, Antrum                                                                           • Final Diagnosis 12/12/2018    Final                    Value:This result contains rich text formatting which cannot be displayed here.   • Gross Description 12/12/2018    Final                    Value:This result contains rich text formatting which cannot be displayed here.     Assessment/Plan      1. Stricture and stenosis of esophagus    2. Acute gastritis without hemorrhage, unspecified gastritis type    .       Orders placed during this encounter include:  No orders of the defined types were placed in this encounter.      * Surgery not found *    Review and/or summary  "of lab tests, radiology, procedures, medications. Review and summary of old records and obtaining of history. The risks and benefits of my recommendations, as well as other treatment options were discussed with the patient today. Questions were answered.    No orders of the defined types were placed in this encounter.      Follow-up: Return if symptoms worsen or fail to improve.          This document has been electronically signed by TESFAYE Marquez on December 19, 2018 1:24 PM             Results for orders placed or performed during the hospital encounter of 12/12/18   Tissue Pathology Exam   Result Value Ref Range    Case Report       Surgical Pathology Report                         Case: [id number]                                  Authorizing Provider:  Jam Mccracken MD        Collected:           12/12/2018 08:49 AM          Ordering Location:     [facility]             Received:            12/12/2018 09:19 AM                                 [place] [facility]                                                     Pathologist:           Ranjit Snyder MD                                                         Specimen:    Gastric, Antrum                                                                            Final Diagnosis       MUCOSA, ANTRUM OF STOMACH:   REACTIVE GASTROPATHY.       Gross Description       The container is labeled \"antrum of stomach\" and has a nodular fragment of white soft tissue measuring 0.5 cm in greatest dimension.  The entire specimen is embedded as 1A.      Results for orders placed or performed in visit on 11/27/18   Urinalysis, Microscopic Only - Urine, Clean Catch   Result Value Ref Range    RBC, UA None Seen None Seen /HPF    WBC, UA 3-5 (A) None Seen /HPF    Bacteria, UA Trace (A) None Seen /HPF    Squamous Epithelial Cells, UA 3-6 (A) None Seen, 0-2 /HPF    Hyaline Casts, UA None Seen None Seen /LPF    Mucus, UA Moderate/2+ (A) None Seen, Trace /HPF    " Methodology Manual Light Microscopy    Urinalysis With Culture If Indicated - Urine, Clean Catch   Result Value Ref Range    Color, UA Yellow Yellow, Straw    Appearance, UA Clear Clear    pH, UA 5.5 5.5 - 8.0    Specific Gravity, UA >=1.030 1.005 - 1.030    Glucose, UA Negative Negative    Ketones, UA Negative Negative    Bilirubin, UA Negative Negative    Blood, UA Negative Negative    Protein, UA Negative Negative    Leuk Esterase, UA Trace (A) Negative    Nitrite, UA Negative Negative    Urobilinogen, UA 0.2 E.U./dL 0.2 - 1.0 E.U./dL   CBC Auto Differential   Result Value Ref Range    WBC 5.30 3.20 - 9.80 10*3/mm3    RBC 4.28 3.77 - 5.16 10*6/mm3    Hemoglobin 12.4 12.0 - 15.5 g/dL    Hematocrit 37.9 35.0 - 45.0 %    MCV 88.6 80.0 - 98.0 fL    MCH 29.0 26.5 - 34.0 pg    MCHC 32.7 31.4 - 36.0 g/dL    RDW 13.5 11.5 - 14.5 %    RDW-SD 41.9 36.4 - 46.3 fl    MPV 10.6 8.0 - 12.0 fL    Platelets 288 150 - 450 10*3/mm3    Neutrophil % 65.0 37.0 - 80.0 %    Lymphocyte % 23.0 10.0 - 50.0 %    Monocyte % 10.9 0.0 - 12.0 %    Eosinophil % 0.9 0.0 - 7.0 %    Basophil % 0.2 0.0 - 2.0 %    Neutrophils, Absolute 3.44 2.00 - 8.60 10*3/mm3    Lymphocytes, Absolute 1.22 0.60 - 4.20 10*3/mm3    Monocytes, Absolute 0.58 0.00 - 0.90 10*3/mm3    Eosinophils, Absolute 0.05 0.00 - 0.70 10*3/mm3    Basophils, Absolute 0.01 0.00 - 0.20 10*3/mm3   TSH   Result Value Ref Range    TSH 2.620 0.460 - 4.680 mIU/mL   Hemoglobin A1c   Result Value Ref Range    Hemoglobin A1C 6.1 (H) 4 - 5.6 %   Vitamin B12   Result Value Ref Range    Vitamin B-12 509 239 - 931 pg/mL   Lipid panel   Result Value Ref Range    Total Cholesterol 250 (H) 150 - 200 mg/dL    Triglycerides 146 <=150 mg/dL    HDL Cholesterol 55 40 - 59 mg/dL    LDL Cholesterol  166 (H) <=100 mg/dL    VLDL Cholesterol 29.2 mg/dL    LDL/HDL Ratio 3.01 0.00 - 3.22   Comprehensive metabolic panel   Result Value Ref Range    Glucose 167 (H) 74 - 99 mg/dL    BUN 14 7 - 17 mg/dL    Creatinine  0.74 0.52 - 1.04 mg/dL    Sodium 142 137 - 145 mmol/L    Potassium 4.2 3.4 - 5.0 mmol/L    Chloride 105 98 - 107 mmol/L    CO2 29.0 22.0 - 30.0 mmol/L    Calcium 9.8 8.4 - 10.2 mg/dL    Total Protein 6.9 6.3 - 8.2 g/dL    Albumin 4.60 3.50 - 5.00 g/dL    ALT (SGPT) 17 <=35 U/L    AST (SGOT) 23 14 - 36 U/L    Alkaline Phosphatase 62 38 - 126 U/L    Total Bilirubin 0.7 0.2 - 1.3 mg/dL    eGFR Non African Amer 77 39 - 90 mL/min/1.73    Globulin 2.3 2.3 - 3.5 gm/dL    A/G Ratio 2.0 (H) 1.1 - 1.8 g/dL    BUN/Creatinine Ratio 18.9 7.0 - 25.0    Anion Gap 8.0 5.0 - 15.0 mmol/L   Results for orders placed or performed during the hospital encounter of 01/15/18   POCT Urine Micro   Result Value Ref Range    Leukocytes, UA     POCT Urinalysis (automated dipstick)   Result Value Ref Range    Color Yellow Yellow, Straw, Dark Yellow, Coty    Clarity, UA Clear Clear    Glucose, UA Negative Negative, 1000 mg/dL (3+) mg/dL    Bilirubin Negative Negative    Ketones, UA Negative Negative    Specific Gravity  1.010 1.005 - 1.030    Blood, UA Negative Negative    pH, Urine 6.0 5.0 - 8.0    Protein, POC Negative Negative mg/dL    Urobilinogen, UA Normal Normal    Leukocytes Small (1+) (A) Negative    Nitrite, UA Negative Negative   Results for orders placed or performed in visit on 06/06/17   CBC Auto Differential   Result Value Ref Range    WBC 6.29 3.20 - 9.80 10*3/mm3    RBC 4.83 3.77 - 5.16 10*6/mm3    Hemoglobin 14.8 12.0 - 15.5 g/dL    Hematocrit 42.8 35.0 - 45.0 %    MCV 88.6 80.0 - 98.0 fL    MCH 30.6 26.5 - 34.0 pg    MCHC 34.6 31.4 - 36.0 g/dL    RDW 12.7 11.5 - 14.5 %    RDW-SD 40.8 36.4 - 46.3 fl    MPV 10.9 8.0 - 12.0 fL    Platelets 253 150 - 450 10*3/mm3    Neutrophil % 74.8 37.0 - 80.0 %    Lymphocyte % 16.4 10.0 - 50.0 %    Monocyte % 7.6 0.0 - 12.0 %    Eosinophil % 0.6 0.0 - 7.0 %    Basophil % 0.3 0.0 - 2.0 %    Immature Grans % 0.3 0.0 - 0.5 %    Neutrophils, Absolute 4.70 2.00 - 8.60 10*3/mm3    Lymphocytes,  Absolute 1.03 0.60 - 4.20 10*3/mm3    Monocytes, Absolute 0.48 0.00 - 0.90 10*3/mm3    Eosinophils, Absolute 0.04 0.00 - 0.70 10*3/mm3    Basophils, Absolute 0.02 0.00 - 0.20 10*3/mm3    Immature Grans, Absolute 0.02 0.00 - 0.02 10*3/mm3     *Note: Due to a large number of results and/or encounters for the requested time period, some results have not been displayed. A complete set of results can be found in Results Review.

## 2019-08-08 ENCOUNTER — OFFICE VISIT (OUTPATIENT)
Dept: FAMILY MEDICINE CLINIC | Facility: CLINIC | Age: 74
End: 2019-08-08

## 2019-08-08 VITALS
DIASTOLIC BLOOD PRESSURE: 68 MMHG | HEART RATE: 94 BPM | HEIGHT: 63 IN | WEIGHT: 117.8 LBS | TEMPERATURE: 97.3 F | BODY MASS INDEX: 20.87 KG/M2 | SYSTOLIC BLOOD PRESSURE: 140 MMHG

## 2019-08-08 DIAGNOSIS — R30.0 DYSURIA: Primary | ICD-10-CM

## 2019-08-08 DIAGNOSIS — R73.9 HYPERGLYCEMIA: ICD-10-CM

## 2019-08-08 DIAGNOSIS — R79.9 ABNORMAL FINDING OF BLOOD CHEMISTRY: ICD-10-CM

## 2019-08-08 DIAGNOSIS — Z80.0 FAMILY HISTORY OF PANCREATIC CANCER: ICD-10-CM

## 2019-08-08 DIAGNOSIS — R35.0 INCREASED FREQUENCY OF URINATION: ICD-10-CM

## 2019-08-08 LAB
BILIRUB UR QL STRIP: NEGATIVE
CLARITY UR: CLEAR
COLOR UR: YELLOW
GLUCOSE UR STRIP-MCNC: NEGATIVE MG/DL
HGB UR QL STRIP.AUTO: NEGATIVE
KETONES UR QL STRIP: NEGATIVE
LEUKOCYTE ESTERASE UR QL STRIP.AUTO: NEGATIVE
NITRITE UR QL STRIP: NEGATIVE
PH UR STRIP.AUTO: <=5 [PH] (ref 5.5–8)
PROT UR QL STRIP: NEGATIVE
SP GR UR STRIP: 1.02 (ref 1–1.03)
UROBILINOGEN UR QL STRIP: ABNORMAL

## 2019-08-08 PROCEDURE — 81003 URINALYSIS AUTO W/O SCOPE: CPT | Performed by: NURSE PRACTITIONER

## 2019-08-08 PROCEDURE — 99213 OFFICE O/P EST LOW 20 MIN: CPT | Performed by: NURSE PRACTITIONER

## 2019-09-03 ENCOUNTER — OFFICE VISIT (OUTPATIENT)
Dept: FAMILY MEDICINE CLINIC | Facility: CLINIC | Age: 74
End: 2019-09-03

## 2019-09-03 VITALS
OXYGEN SATURATION: 98 % | HEIGHT: 63 IN | WEIGHT: 118 LBS | HEART RATE: 101 BPM | TEMPERATURE: 97.9 F | BODY MASS INDEX: 20.91 KG/M2 | SYSTOLIC BLOOD PRESSURE: 140 MMHG | DIASTOLIC BLOOD PRESSURE: 62 MMHG

## 2019-09-03 DIAGNOSIS — J01.10 ACUTE NON-RECURRENT FRONTAL SINUSITIS: Primary | ICD-10-CM

## 2019-09-03 DIAGNOSIS — R05.9 COUGH: ICD-10-CM

## 2019-09-03 PROCEDURE — 99213 OFFICE O/P EST LOW 20 MIN: CPT | Performed by: NURSE PRACTITIONER

## 2019-09-03 PROCEDURE — 96372 THER/PROPH/DIAG INJ SC/IM: CPT | Performed by: NURSE PRACTITIONER

## 2019-09-03 RX ORDER — BENZONATATE 100 MG/1
100 CAPSULE ORAL 3 TIMES DAILY PRN
Qty: 30 CAPSULE | Refills: 0 | Status: SHIPPED | OUTPATIENT
Start: 2019-09-03 | End: 2019-11-20

## 2019-09-03 RX ORDER — AZITHROMYCIN 200 MG/5ML
POWDER, FOR SUSPENSION ORAL
Qty: 41 ML | Refills: 0 | Status: SHIPPED | OUTPATIENT
Start: 2019-09-03 | End: 2019-11-20

## 2019-09-03 RX ORDER — TRIAMCINOLONE ACETONIDE 40 MG/ML
80 INJECTION, SUSPENSION INTRA-ARTICULAR; INTRAMUSCULAR ONCE
Status: COMPLETED | OUTPATIENT
Start: 2019-09-03 | End: 2019-09-03

## 2019-09-03 RX ADMIN — TRIAMCINOLONE ACETONIDE 80 MG: 40 INJECTION, SUSPENSION INTRA-ARTICULAR; INTRAMUSCULAR at 14:13

## 2019-09-03 NOTE — PROGRESS NOTES
"Subjective   Kimi Buck is a 74 y.o. female. Patient here today with complaints of URI  pt here today with complaints of cough, chest congestion, sneezing, eyes burning, symptoms present x 2days. [place]\". Denies fever. Has tried benadryl which helped some. Cough is productive of whitish sputum.     Vitals:    09/03/19 1307   BP: 140/62   Pulse: 101   Temp: 97.9 °F (36.6 °C)   SpO2: 98%     Past Medical History:   Diagnosis Date   • Heart murmur    • Urinary tract infection    • Visual impairment      Cough   This is a new problem. The current episode started in the past 7 days. The problem has been unchanged. The problem occurs every few minutes. The cough is productive of sputum and productive of purulent sputum. Associated symptoms include ear congestion, headaches, nasal congestion, postnasal drip, rhinorrhea and a sore throat. Pertinent negatives include no fever, shortness of breath, weight loss or wheezing. Nothing aggravates the symptoms. She has tried nothing for the symptoms. The treatment provided no relief.   Sinusitis   This is a new problem. The current episode started in the past 7 days. The problem is unchanged. There has been no fever. Associated symptoms include congestion, coughing, headaches, sneezing and a sore throat. Pertinent negatives include no shortness of breath. Treatments tried: benadryl. The treatment provided mild relief.        The following portions of the patient's history were reviewed and updated as appropriate: allergies, current medications, past family history, past medical history, past social history, past surgical history and problem list.    Review of Systems   Constitutional: Negative.  Negative for fever and weight loss.   HENT: Positive for congestion, postnasal drip, rhinorrhea, sneezing and sore throat.    Eyes: Negative.    Respiratory: Positive for cough. Negative for shortness of breath and wheezing.    Cardiovascular: Negative.  "   Gastrointestinal: Negative.    Endocrine: Negative.    Genitourinary: Negative.    Musculoskeletal: Negative.    Skin: Negative.    Allergic/Immunologic: Negative.    Neurological: Positive for headaches.   Hematological: Negative.    Psychiatric/Behavioral: Negative.        Objective   Physical Exam   Constitutional: She is oriented to person, place, and time. She appears well-developed and well-nourished.   HENT:   Head: Normocephalic and atraumatic.   Right Ear: Hearing, external ear and ear canal normal. Tympanic membrane is bulging.   Left Ear: Hearing, external ear and ear canal normal. Tympanic membrane is bulging.   Nose: Mucosal edema and rhinorrhea present. Right sinus exhibits frontal sinus tenderness. Right sinus exhibits no maxillary sinus tenderness. Left sinus exhibits frontal sinus tenderness. Left sinus exhibits no maxillary sinus tenderness.   Mouth/Throat: Uvula is midline and mucous membranes are normal. Posterior oropharyngeal erythema (with yellowish drg noted, cobblestoning appearance noted ) present. No tonsillar exudate.   Neck: Neck supple.   Cardiovascular: Normal rate and regular rhythm. Exam reveals no gallop and no friction rub.   Murmur heard.  Pulmonary/Chest: Effort normal and breath sounds normal. No stridor. No respiratory distress. She has no wheezes. She has no rales.   Lymphadenopathy:     She has no cervical adenopathy.   Neurological: She is alert and oriented to person, place, and time.   Skin: Skin is warm and dry. No rash noted. She is not diaphoretic. No erythema. No pallor.   Psychiatric: She has a normal mood and affect. Her behavior is normal.   Nursing note and vitals reviewed.      Assessment/Plan   Kimi was seen today for uri.    Diagnoses and all orders for this visit:    Acute non-recurrent frontal sinusitis  -     triamcinolone acetonide (KENALOG-40) injection 80 mg    Cough    Other orders  -     benzonatate (TESSALON PERLES) 100 MG capsule; Take 1 capsule by  mouth 3 (Three) Times a Day As Needed for Cough.  -     azithromycin (ZITHROMAX) 200 MG/5ML suspension; Give the patient 536 mg (13 ml) by mouth the first day then 268 mg (7 ml) by mouth daily for 4 days.    she is given kenalog inj IM in office today and margaux well  She is treated with tessalon prn cough, zithromax susp as above, she requests liquid medicine stating that she cannot swallow pills well  If symptoms persist or worsen she is to RTC for recheck  She is aware and is in agreement to this plan  All questions and concerns are addressed with understanding noted.

## 2019-09-11 ENCOUNTER — OFFICE VISIT (OUTPATIENT)
Dept: FAMILY MEDICINE CLINIC | Facility: CLINIC | Age: 74
End: 2019-09-11

## 2019-09-11 VITALS
BODY MASS INDEX: 20.55 KG/M2 | HEART RATE: 97 BPM | HEIGHT: 63 IN | WEIGHT: 116 LBS | TEMPERATURE: 97 F | SYSTOLIC BLOOD PRESSURE: 132 MMHG | OXYGEN SATURATION: 100 % | DIASTOLIC BLOOD PRESSURE: 78 MMHG

## 2019-09-11 DIAGNOSIS — E53.8 B12 DEFICIENCY: ICD-10-CM

## 2019-09-11 DIAGNOSIS — J40 BRONCHITIS: ICD-10-CM

## 2019-09-11 DIAGNOSIS — Z00.00 ROUTINE MEDICAL EXAM: ICD-10-CM

## 2019-09-11 DIAGNOSIS — R09.89 BRUIT OF LEFT CAROTID ARTERY: ICD-10-CM

## 2019-09-11 DIAGNOSIS — Z01.419 WELL WOMAN EXAM WITH ROUTINE GYNECOLOGICAL EXAM: Primary | ICD-10-CM

## 2019-09-11 PROCEDURE — G0123 SCREEN CERV/VAG THIN LAYER: HCPCS | Performed by: NURSE PRACTITIONER

## 2019-09-11 PROCEDURE — 99213 OFFICE O/P EST LOW 20 MIN: CPT | Performed by: NURSE PRACTITIONER

## 2019-09-11 RX ORDER — ALBUTEROL SULFATE 90 UG/1
2 AEROSOL, METERED RESPIRATORY (INHALATION) EVERY 4 HOURS PRN
Qty: 1 INHALER | Refills: 0 | Status: SHIPPED | OUTPATIENT
Start: 2019-09-11 | End: 2019-11-20

## 2019-09-11 RX ORDER — PREDNISONE 20 MG/1
20 TABLET ORAL 2 TIMES DAILY
Qty: 10 TABLET | Refills: 0 | Status: SHIPPED | OUTPATIENT
Start: 2019-09-11 | End: 2019-09-16

## 2019-09-11 NOTE — PROGRESS NOTES
Subjective   Kimi Buck is a 74 y.o. female. Patient here today with complaints of Gynecologic Exam  pt here today for well woman exam, denies vaginal or breast complaints, continues to have cough and wheezing. Has taken antibiotics , does not smoke. Reports she does not want breast exam since she does SBEs on her own and mammogram is UTD. She denies breast , uterine, colon or ovarian cancers in her family. Denies tobacco use.     Vitals:    19 0906   BP: 132/78   Pulse: 97   Temp: 97 °F (36.1 °C)   SpO2: 100%     Past Medical History:   Diagnosis Date   • Heart murmur    • Urinary tract infection    • Visual impairment      Family History   Problem Relation Age of Onset   • Cancer Mother    • COPD Mother    • Vision loss Mother    • Vision loss Father      Past Surgical History:   Procedure Laterality Date   • COLONOSCOPY     • ENDOSCOPY N/A 2018    Procedure: ESOPHAGOGASTRODUODENOSCOPY possible dilation urgent;  Surgeon: Jam Mccracken MD;  Location: [facility] ENDOSCOPY;  Service: Gastroenterology   • EYE SURGERY     • VARICOSE VEIN SURGERY       Social History     Socioeconomic History   • Marital status:      Spouse name: Not on file   • Number of children: Not on file   • Years of education: Not on file   • Highest education level: Not on file   Tobacco Use   • Smoking status: Former Smoker   • Smokeless tobacco: Never Used   Substance and Sexual Activity   • Alcohol use: No   • Drug use: No   • Sexual activity: Yes     Sexual History:       OB History    Para Term  AB Living   3 3 3  0  0  3   SAB TAB Ectopic Molar Multiple Live Births        0  0  0  3      # Outcome Date GA Lbr Singh/2nd Weight Sex Delivery Anes PTL Lv   3 Term            2 Term            1 Term                 Menstrual History:  OB History      Para Term  AB Living    3 3 3  0  0  3    SAB TAB Ectopic Molar Multiple Live Births         0  0  0  3         Menarche age: 18  No LMP recorded.  Patient is postmenopausal.         Cough   This is a new problem. The current episode started 1 to 4 weeks ago. The problem has been unchanged. The problem occurs every few minutes. She has tried rest for the symptoms. The treatment provided no relief.   Gynecologic Exam   The patient's pertinent negatives include no genital itching, genital lesions, genital odor, genital rash, pelvic pain, vaginal bleeding or vaginal discharge. The patient is experiencing no pain. She is not pregnant. She is sexually active. She is postmenopausal. There is no history of a  section, an ectopic pregnancy, endometriosis, a gynecological surgery, herpes simplex, menorrhagia, metrorrhagia, ovarian cysts or a terminated pregnancy.        The following portions of the patient's history were reviewed and updated as appropriate: allergies, current medications, past family history, past medical history, past social history, past surgical history and problem list.    Review of Systems   Constitutional: Negative.    HENT: Negative.    Eyes: Negative.    Respiratory: Positive for cough.    Cardiovascular: Negative.    Gastrointestinal: Negative.    Endocrine: Negative.    Genitourinary: Negative.  Negative for menorrhagia, pelvic pain and vaginal discharge.   Musculoskeletal: Negative.    Skin: Negative.    Allergic/Immunologic: Negative.    Neurological: Negative.    Hematological: Negative.    Psychiatric/Behavioral: Negative.        Objective   Physical Exam   Constitutional: She is oriented to person, place, and time. She appears well-developed and well-nourished. No distress.   HENT:   Head: Normocephalic and atraumatic.   Eyes: Right eye exhibits no discharge. Left eye exhibits no discharge.   Neck: Neck supple. Carotid bruit is present.   Cardiovascular: Normal rate, regular rhythm, normal heart sounds and intact distal pulses. Exam reveals no gallop and no friction rub.   No murmur heard.  Pulmonary/Chest: Effort normal and  breath sounds normal. No stridor. No respiratory distress. She has no wheezes. She has no rales.   Breast exam deferred today, pt declined    Abdominal: Soft. Bowel sounds are normal. She exhibits no distension and no mass. There is no tenderness. There is no guarding. Hernia confirmed negative in the right inguinal area and confirmed negative in the left inguinal area.   Genitourinary: Rectum normal, vagina normal and uterus normal. Rectal exam shows no external hemorrhoid, no internal hemorrhoid, no fissure, no mass, no tenderness, anal tone normal and guaiac negative stool. Pelvic exam was performed with patient supine. There is no rash, tenderness, lesion or injury on the right labia. There is no rash, tenderness, lesion or injury on the left labia. Uterus is not deviated, not enlarged, not fixed and not tender. Cervix exhibits no motion tenderness, no discharge and no friability. Right adnexum displays no mass, no tenderness and no fullness. Left adnexum displays no mass, no tenderness and no fullness. No erythema, tenderness or bleeding in the vagina. No foreign body in the vagina. No signs of injury around the vagina. No vaginal discharge found.       Genitourinary Comments: Cervical os has appearance as noted on graph, denies any hx of abnormal paps or procedures to cervix in the past   Musculoskeletal: Normal range of motion. She exhibits no edema, tenderness or deformity.   Lymphadenopathy:        Right: No inguinal adenopathy present.        Left: No inguinal adenopathy present.   Neurological: She is alert and oriented to person, place, and time. She has normal reflexes. Coordination normal.   Skin: Skin is warm and dry. No rash noted. She is not diaphoretic. No erythema. No pallor.   Psychiatric: She has a normal mood and affect. Her behavior is normal. Judgment and thought content normal.   Nursing note and vitals reviewed.      Assessment/Plan   Kimi was seen today for gynecologic exam.    Diagnoses  and all orders for this visit:    Well woman exam with routine gynecological exam  -     Liquid-based Pap Smear, Diagnostic    Bruit of left carotid artery  -     US Carotid Bilateral    Bronchitis    B12 deficiency  -     Vitamin B12; Future    Routine medical exam  -     Lipid panel; Future  -     CBC & Differential; Future  -     Comprehensive metabolic panel; Future    Other orders  -     predniSONE (DELTASONE) 20 MG tablet; Take 1 tablet by mouth 2 (Two) Times a Day for 5 days.  -     albuterol sulfate  (90 Base) MCG/ACT inhaler; Inhale 2 puffs Every 4 (Four) Hours As Needed for Wheezing.    encouraged to continue SBEs monthly, is given albuterol inhaler and states she has used in the past, understands how to use. Also given prednisone po as above and if symptoms persist or worsen she is asked to RTC for recheck. Pap is obtained and sent to lab for cytology, will inform of results via phone/card. Will obtain labs as above when fasting since due. Will also order carotid US and will inform of results, refer if nec. She is aware and is in agreement to this plan. All questions and concerns are addressed with understanding noted.

## 2019-09-16 LAB
GEN CATEG CVX/VAG CYTO-IMP: NORMAL
LAB AP CASE REPORT: NORMAL
LAB AP GYN ADDITIONAL INFORMATION: NORMAL
PATH INTERP SPEC-IMP: NORMAL
STAT OF ADQ CVX/VAG CYTO-IMP: NORMAL

## 2019-11-20 ENCOUNTER — OFFICE VISIT (OUTPATIENT)
Dept: FAMILY MEDICINE CLINIC | Facility: CLINIC | Age: 74
End: 2019-11-20

## 2019-11-20 VITALS
HEART RATE: 132 BPM | SYSTOLIC BLOOD PRESSURE: 126 MMHG | WEIGHT: 125.2 LBS | BODY MASS INDEX: 22.18 KG/M2 | HEIGHT: 63 IN | DIASTOLIC BLOOD PRESSURE: 82 MMHG | TEMPERATURE: 97.2 F

## 2019-11-20 DIAGNOSIS — R94.31 ABNORMAL ELECTROCARDIOGRAM (ECG) (EKG): ICD-10-CM

## 2019-11-20 DIAGNOSIS — R53.83 FATIGUE, UNSPECIFIED TYPE: ICD-10-CM

## 2019-11-20 DIAGNOSIS — R79.9 ABNORMAL FINDING OF BLOOD CHEMISTRY, UNSPECIFIED: ICD-10-CM

## 2019-11-20 DIAGNOSIS — Z00.00 MEDICARE ANNUAL WELLNESS VISIT, SUBSEQUENT: Primary | ICD-10-CM

## 2019-11-20 DIAGNOSIS — R00.0 TACHYCARDIA: ICD-10-CM

## 2019-11-20 PROCEDURE — 93000 ELECTROCARDIOGRAM COMPLETE: CPT | Performed by: NURSE PRACTITIONER

## 2019-11-20 PROCEDURE — 99214 OFFICE O/P EST MOD 30 MIN: CPT | Performed by: NURSE PRACTITIONER

## 2019-11-20 PROCEDURE — 96160 PT-FOCUSED HLTH RISK ASSMT: CPT | Performed by: NURSE PRACTITIONER

## 2019-11-20 PROCEDURE — G0439 PPPS, SUBSEQ VISIT: HCPCS | Performed by: NURSE PRACTITIONER

## 2019-11-20 NOTE — PROGRESS NOTES
The ABCs of the Annual Wellness Visit  Subsequent Medicare Wellness Visit    Chief Complaint   Patient presents with   • Fatigue       Subjective   History of Present Illness:  Kimi Buck is a 74 y.o. female who presents for a Subsequent Medicare Wellness Visit.    HEALTH RISK ASSESSMENT    Recent Hospitalizations:  No hospitalization(s) within the last year.    Current Medical Providers:  Patient Care Team:  Bhakti Tran APRN as PCP - General (Family Medicine)    Smoking Status:  Social History     Tobacco Use   Smoking Status Former Smoker   Smokeless Tobacco Never Used       Alcohol Consumption:  Social History     Substance and Sexual Activity   Alcohol Use No       Depression Screen:   PHQ-2/PHQ-9 Depression Screening 10/31/2018   Little interest or pleasure in doing things 0   Feeling down, depressed, or hopeless 0   Total Score 0       Fall Risk Screen:  STEADI Fall Risk Assessment has not been completed.    Health Habits and Functional and Cognitive Screening:  Functional & Cognitive Status 11/20/2019   Do you have difficulty preparing food and eating? No   Do you have difficulty bathing yourself, getting dressed or grooming yourself? No   Do you have difficulty using the toilet? No   Do you have difficulty moving around from place to place? No   Do you have trouble with steps or getting out of a bed or a chair? No   Current Diet Limited Junk Food   Dental Exam Up to date   Eye Exam Up to date   Exercise (times per week) 0 times per week   Current Exercise Activities Include None   Do you need help using the phone?  No   Are you deaf or do you have serious difficulty hearing?  No   Do you need help with transportation? No   Do you need help shopping? No   Do you need help preparing meals?  No   Do you need help with housework?  No   Do you need help with laundry? No   Do you need help taking your medications? No   Do you need help managing money? No   Do you ever drive or ride in a car without  wearing a seat belt? No   Have you felt unusual stress, anger or loneliness in the last month? No   Who do you live with? Spouse   If you need help, do you have trouble finding someone available to you? No   Have you been bothered in the last four weeks by sexual problems? No   Do you have difficulty concentrating, remembering or making decisions? No         Does the patient have evidence of cognitive impairment? No    Asprin use counseling:Taking ASA appropriately as indicated    Age-appropriate Screening Schedule:  Refer to the list below for future screening recommendations based on patient's age, sex and/or medical conditions. Orders for these recommended tests are listed in the plan section. The patient has been provided with a written plan.    Health Maintenance   Topic Date Due   • ZOSTER VACCINE (1 of 2) 03/09/1996   • PNEUMOCOCCAL VACCINES (65+ LOW/MEDIUM RISK) (2 of 2 - PPSV23) 06/06/2018   • INFLUENZA VACCINE  08/01/2019   • LIPID PANEL  11/27/2019   • MAMMOGRAM  12/18/2020   • COLONOSCOPY  07/16/2024   • TDAP/TD VACCINES (3 - Td) 06/06/2027          The following portions of the patient's history were reviewed and updated as appropriate: allergies, current medications, past family history, past medical history, past social history, past surgical history and problem list.    Outpatient Medications Prior to Visit   Medication Sig Dispense Refill   • albuterol sulfate  (90 Base) MCG/ACT inhaler Inhale 2 puffs Every 4 (Four) Hours As Needed for Wheezing. 1 inhaler 0   • azithromycin (ZITHROMAX) 200 MG/5ML suspension Give the patient 536 mg (13 ml) by mouth the first day then 268 mg (7 ml) by mouth daily for 4 days. 41 mL 0   • benzonatate (TESSALON PERLES) 100 MG capsule Take 1 capsule by mouth 3 (Three) Times a Day As Needed for Cough. 30 capsule 0     No facility-administered medications prior to visit.        Patient Active Problem List   Diagnosis   • Abnormal CXR   • History of tobacco use   •  "Murmur, cardiac   • Dysphagia       Advanced Care Planning:  Patient does not have an advance directive - not interested in additional information    Review of Systems    Compared to one year ago, the patient feels her physical health is worse.  Compared to one year ago, the patient feels her mental health is the same.    Reviewed chart for potential of high risk medication in the elderly: yes  Reviewed chart for potential of harmful drug interactions in the elderly:yes    Objective         Vitals:    11/20/19 1025   BP: 126/82   Pulse: (!) 132   Temp: 97.2 °F (36.2 °C)   Weight: 56.8 kg (125 lb 3.2 oz)   Height: 160 cm (63\")   PainSc: 0-No pain       Body mass index is 22.18 kg/m².  Discussed the patient's BMI with her. The BMI is in the acceptable range.    Physical Exam          Assessment/Plan   Medicare Risks and Personalized Health Plan  CMS Preventative Services Quick Reference  Advance Directive Discussion  Cardiovascular risk  Fall Risk  Immunizations Discussed/Encouraged (specific immunizations; Influenza, Pneumococcal 23, Prevnar and Shingrix )  Osteoprorosis Risk    The above risks/problems have been discussed with the patient.  Pertinent information has been shared with the patient in the After Visit Summary.  Follow up plans and orders are seen below in the Assessment/Plan Section.    There are no diagnoses linked to this encounter.  Follow Up:  No Follow-up on file.     An After Visit Summary and PPPS were given to the patient.             "

## 2019-11-20 NOTE — PROGRESS NOTES
"Subjective   Kimi Buck is a 74 y.o. female. Patient here today with complaints of Fatigue  pt here today with  for complaints of excessive fatigue in the last week, tachycardia in the last week as well. States she has known heart murmur but has not been following cardiology. She denies shortness of breath, chest pain. She does report that she noticed shoulder pressure, feels \" a weight in my shoulders\" and also reports worsening reflux in the last few days.     Vitals:    11/20/19 1025   BP: 126/82   Pulse: (!) 132   Temp: 97.2 °F (36.2 °C)   Weight: 56.8 kg (125 lb 3.2 oz)   Height: 160 cm (63\")   PainSc: 0-No pain     Body mass index is 22.18 kg/m².  Past Medical History:   Diagnosis Date   • Heart murmur    • Urinary tract infection    • Visual impairment      Fatigue   This is a new problem. The current episode started in the past 7 days. The problem occurs constantly. The problem has been unchanged. Associated symptoms include fatigue. Pertinent negatives include no chest pain or coughing. Nothing aggravates the symptoms. She has tried sleep, rest and relaxation for the symptoms. The treatment provided no relief.   Heart Problem   This is a new problem. The current episode started in the past 7 days. The problem occurs constantly. The problem has been unchanged. Associated symptoms include fatigue. Pertinent negatives include no chest pain or coughing. The symptoms are aggravated by exertion. She has tried sleep, rest and relaxation for the symptoms. The treatment provided no relief.        The following portions of the patient's history were reviewed and updated as appropriate: allergies, current medications, past family history, past medical history, past social history, past surgical history and problem list.    Review of Systems   Constitutional: Positive for fatigue.   HENT: Negative.    Eyes: Negative.    Respiratory: Negative.  Negative for cough and shortness of breath.    Cardiovascular: " Positive for palpitations. Negative for chest pain.   Gastrointestinal: Negative.    Endocrine: Negative.    Genitourinary: Negative.    Musculoskeletal: Negative.    Skin: Negative.    Allergic/Immunologic: Negative.    Neurological: Negative.    Hematological: Negative.    Psychiatric/Behavioral: Negative.        Objective     Physical Exam   Constitutional: She is oriented to person, place, and time. She appears well-developed and well-nourished. No distress.   HENT:   Head: Normocephalic and atraumatic.   Cardiovascular:  Occasional extrasystoles are present. Tachycardia present. Exam reveals no gallop and no friction rub.   Murmur heard.  Pulmonary/Chest: Effort normal and breath sounds normal. No stridor. No respiratory distress. She has no wheezes. She has no rales.   Musculoskeletal: She exhibits no edema.   Neurological: She is alert and oriented to person, place, and time.   Skin: She is not diaphoretic.   Nursing note and vitals reviewed.      Assessment/Plan   Kimi was seen today for fatigue.    Diagnoses and all orders for this visit:    Medicare annual wellness visit, subsequent    Fatigue, unspecified type  -     CBC & Differential; Future  -     Comprehensive metabolic panel; Future  -     TSH; Future  -     T4, free; Future  -     T3, free; Future  -     Magnesium; Future  -     Ferritin; Future  -     Folate; Future  -     Iron and TIBC; Future  -     Vitamin B12; Future  -     ECG 12 Lead    Tachycardia  -     CBC & Differential; Future  -     Comprehensive metabolic panel; Future  -     TSH; Future  -     T4, free; Future  -     T3, free; Future  -     Magnesium; Future  -     Ferritin; Future  -     Folate; Future  -     Iron and TIBC; Future  -     Vitamin B12; Future  -     ECG 12 Lead    Abnormal electrocardiogram (ECG) (EKG)    Abnormal finding of blood chemistry, unspecified   -     Ferritin; Future  -     Iron and TIBC; Future    ECG obtained in office today, see below  Labs as above were  ordered however after ECG completed pt is sent straight to ER for further eval / treatment as they deem nec  She has taken ASA therefore is not given in office today  Pt sent to [facility] ER which is the closest ER , driven per , and is sent straight to ER  Report called and copy to ECG sent to [facility] ER  They are aware and are in agreement to this plan  All questions and concerns are addressed with understanding noted.     ECG 12 Lead  Date/Time: 11/20/2019 11:24 AM  Performed by: Bhakti Tran APRN  Authorized by: Bhakti Tran APRN   Comparison: not compared with previous ECG   Previous ECG: no previous ECG available  Rhythm: sinus tachycardia  Rate: tachycardic  Other findings: non-specific ST-T wave changes and left atrial abnormality    Clinical impression: abnormal EKG

## 2019-12-03 ENCOUNTER — OFFICE VISIT (OUTPATIENT)
Dept: FAMILY MEDICINE CLINIC | Facility: CLINIC | Age: 74
End: 2019-12-03

## 2019-12-03 ENCOUNTER — LAB (OUTPATIENT)
Dept: LAB | Facility: OTHER | Age: 74
End: 2019-12-03

## 2019-12-03 VITALS
DIASTOLIC BLOOD PRESSURE: 62 MMHG | WEIGHT: 125.8 LBS | HEIGHT: 63 IN | HEART RATE: 73 BPM | BODY MASS INDEX: 22.29 KG/M2 | TEMPERATURE: 97.7 F | SYSTOLIC BLOOD PRESSURE: 122 MMHG

## 2019-12-03 DIAGNOSIS — K92.2 GASTROINTESTINAL HEMORRHAGE, UNSPECIFIED GASTROINTESTINAL HEMORRHAGE TYPE: ICD-10-CM

## 2019-12-03 DIAGNOSIS — D50.9 IRON DEFICIENCY ANEMIA, UNSPECIFIED IRON DEFICIENCY ANEMIA TYPE: ICD-10-CM

## 2019-12-03 DIAGNOSIS — Z09 HOSPITAL DISCHARGE FOLLOW-UP: Primary | ICD-10-CM

## 2019-12-03 DIAGNOSIS — D64.9 ANEMIA, UNSPECIFIED TYPE: ICD-10-CM

## 2019-12-03 DIAGNOSIS — R00.0 TACHYCARDIA: ICD-10-CM

## 2019-12-03 DIAGNOSIS — R09.89 LEFT CAROTID BRUIT: ICD-10-CM

## 2019-12-03 DIAGNOSIS — R01.1 MURMUR, CARDIAC: ICD-10-CM

## 2019-12-03 DIAGNOSIS — R53.83 FATIGUE, UNSPECIFIED TYPE: ICD-10-CM

## 2019-12-03 DIAGNOSIS — R79.9 ABNORMAL FINDING OF BLOOD CHEMISTRY, UNSPECIFIED: ICD-10-CM

## 2019-12-03 LAB
ALBUMIN SERPL-MCNC: 4.2 G/DL (ref 3.5–5)
ALBUMIN/GLOB SERPL: 1.6 G/DL (ref 1.1–1.8)
ALP SERPL-CCNC: 60 U/L (ref 38–126)
ALT SERPL W P-5'-P-CCNC: 25 U/L
ANION GAP SERPL CALCULATED.3IONS-SCNC: 6 MMOL/L (ref 5–15)
AST SERPL-CCNC: 26 U/L (ref 14–36)
BASOPHILS # BLD AUTO: 0.02 10*3/MM3 (ref 0–0.2)
BASOPHILS NFR BLD AUTO: 0.5 % (ref 0–1.5)
BILIRUB SERPL-MCNC: 0.5 MG/DL (ref 0.2–1.3)
BUN BLD-MCNC: 12 MG/DL (ref 7–23)
BUN/CREAT SERPL: 16.2 (ref 7–25)
CALCIUM SPEC-SCNC: 9.7 MG/DL (ref 8.4–10.2)
CHLORIDE SERPL-SCNC: 104 MMOL/L (ref 101–112)
CO2 SERPL-SCNC: 29 MMOL/L (ref 22–30)
CREAT BLD-MCNC: 0.74 MG/DL (ref 0.52–1.04)
DEPRECATED RDW RBC AUTO: 56.7 FL (ref 37–54)
EOSINOPHIL # BLD AUTO: 0.04 10*3/MM3 (ref 0–0.4)
EOSINOPHIL NFR BLD AUTO: 0.9 % (ref 0.3–6.2)
ERYTHROCYTE [DISTWIDTH] IN BLOOD BY AUTOMATED COUNT: 18.2 % (ref 12.3–15.4)
GFR SERPL CREATININE-BSD FRML MDRD: 77 ML/MIN/1.73 (ref 39–90)
GLOBULIN UR ELPH-MCNC: 2.7 GM/DL (ref 2.3–3.5)
GLUCOSE BLD-MCNC: 150 MG/DL (ref 70–99)
HCT VFR BLD AUTO: 33.3 % (ref 34–46.6)
HGB BLD-MCNC: 10.5 G/DL (ref 12–15.9)
LYMPHOCYTES # BLD AUTO: 0.77 10*3/MM3 (ref 0.7–3.1)
LYMPHOCYTES NFR BLD AUTO: 18.1 % (ref 19.6–45.3)
MAGNESIUM SERPL-MCNC: 2.1 MG/DL (ref 1.6–2.3)
MCH RBC QN AUTO: 27.6 PG (ref 26.6–33)
MCHC RBC AUTO-ENTMCNC: 31.5 G/DL (ref 31.5–35.7)
MCV RBC AUTO: 87.4 FL (ref 79–97)
MONOCYTES # BLD AUTO: 0.43 10*3/MM3 (ref 0.1–0.9)
MONOCYTES NFR BLD AUTO: 10.1 % (ref 5–12)
NEUTROPHILS # BLD AUTO: 3 10*3/MM3 (ref 1.7–7)
NEUTROPHILS NFR BLD AUTO: 70.4 % (ref 42.7–76)
PLATELET # BLD AUTO: 299 10*3/MM3 (ref 140–450)
PMV BLD AUTO: 10.3 FL (ref 6–12)
POTASSIUM BLD-SCNC: 4.7 MMOL/L (ref 3.4–5)
PROT SERPL-MCNC: 6.9 G/DL (ref 6.3–8.6)
RBC # BLD AUTO: 3.81 10*6/MM3 (ref 3.77–5.28)
SODIUM BLD-SCNC: 139 MMOL/L (ref 137–145)
WBC NRBC COR # BLD: 4.26 10*3/MM3 (ref 3.4–10.8)

## 2019-12-03 PROCEDURE — 82746 ASSAY OF FOLIC ACID SERUM: CPT | Performed by: NURSE PRACTITIONER

## 2019-12-03 PROCEDURE — 83735 ASSAY OF MAGNESIUM: CPT | Performed by: NURSE PRACTITIONER

## 2019-12-03 PROCEDURE — 82607 VITAMIN B-12: CPT | Performed by: NURSE PRACTITIONER

## 2019-12-03 PROCEDURE — 36415 COLL VENOUS BLD VENIPUNCTURE: CPT | Performed by: NURSE PRACTITIONER

## 2019-12-03 PROCEDURE — 84481 FREE ASSAY (FT-3): CPT | Performed by: NURSE PRACTITIONER

## 2019-12-03 PROCEDURE — 84466 ASSAY OF TRANSFERRIN: CPT | Performed by: NURSE PRACTITIONER

## 2019-12-03 PROCEDURE — 84443 ASSAY THYROID STIM HORMONE: CPT | Performed by: NURSE PRACTITIONER

## 2019-12-03 PROCEDURE — 82728 ASSAY OF FERRITIN: CPT | Performed by: NURSE PRACTITIONER

## 2019-12-03 PROCEDURE — 85025 COMPLETE CBC W/AUTO DIFF WBC: CPT | Performed by: NURSE PRACTITIONER

## 2019-12-03 PROCEDURE — 83540 ASSAY OF IRON: CPT | Performed by: NURSE PRACTITIONER

## 2019-12-03 PROCEDURE — 84439 ASSAY OF FREE THYROXINE: CPT | Performed by: NURSE PRACTITIONER

## 2019-12-03 PROCEDURE — 99214 OFFICE O/P EST MOD 30 MIN: CPT | Performed by: NURSE PRACTITIONER

## 2019-12-03 PROCEDURE — 80053 COMPREHEN METABOLIC PANEL: CPT | Performed by: NURSE PRACTITIONER

## 2019-12-03 RX ORDER — PANTOPRAZOLE SODIUM 40 MG/1
40 TABLET, DELAYED RELEASE ORAL
COMMUNITY
Start: 2019-11-22 | End: 2019-12-23

## 2019-12-03 RX ORDER — FLUCONAZOLE 100 MG/1
100 TABLET ORAL
COMMUNITY
Start: 2019-11-23 | End: 2019-12-07

## 2019-12-03 RX ORDER — ASPIRIN 81 MG/1
81 TABLET, CHEWABLE ORAL DAILY
COMMUNITY

## 2019-12-03 RX ORDER — SUCRALFATE ORAL 1 G/10ML
1 SUSPENSION ORAL
COMMUNITY
Start: 2019-11-22 | End: 2019-12-23

## 2019-12-03 RX ORDER — ATORVASTATIN CALCIUM 20 MG/1
20 TABLET, FILM COATED ORAL
COMMUNITY
Start: 2019-11-22 | End: 2019-12-23

## 2019-12-03 NOTE — PROGRESS NOTES
"Subjective   Kimi Buck is a 74 y.o. female. Patient here today with complaints of Follow-up  Patient here today with  for hospital follow-up.  At last visit here she was found to be fatigued, tachycardic, sent to hospital where she was diagnosed with anemia due to iron deficiency and GI bleed.  Over the course of a 2-day hospitalization they report she was given 4 units of packed red blood cells with 1 iron infusion.  Reports EGD negative, colonoscopy was approximately 1 year ago and reported as negative.  She has follow-up with surgeon Dr. Little next week.  Reports fatigue much improved, tachycardia resolved, she did see Dr. 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  They report that she will follow-up with cardiology when her  has his next appointment as well.  Records from [facility] including labs are reviewed.  Upon admission to hospital hemoglobin was 5.8 and over the course of the hospitalization after transfusions and prior to discharge her hemoglobin had increased to 9.1. Pt noted to have gastritis, esophogeal candida, was started on PPI, carafate and diflucan which she continues currently.     Vitals:    12/03/19 0830   BP: 122/62   Pulse: 73   Temp: 97.7 °F (36.5 °C)   Weight: 57.1 kg (125 lb 12.8 oz)   Height: 160 cm (63\")   PainSc: 0-No pain     Body mass index is 22.28 kg/m².  Past Medical History:   Diagnosis Date   • Heart murmur    • Urinary tract infection    • Visual impairment      History of Present Illness     The following portions of the patient's history were reviewed and updated as appropriate: allergies, current medications, past family history, past medical history, past social history, past surgical history and problem list.    Review of Systems   Constitutional: Negative.    HENT: Negative.    Eyes: Negative.    Respiratory: Negative.  "   Cardiovascular: Negative.    Gastrointestinal: Negative.    Endocrine: Negative.    Genitourinary: Negative.    Musculoskeletal: Negative.    Skin: Negative.    Allergic/Immunologic: Negative.    Neurological: Negative.    Hematological: Negative.    Psychiatric/Behavioral: Negative.        Objective   Physical Exam   Constitutional: She is oriented to person, place, and time. She appears well-developed and well-nourished. No distress.   HENT:   Head: Normocephalic and atraumatic.   Neck: Carotid bruit is present (ausc on L ).   Cardiovascular: Normal rate and regular rhythm. Exam reveals no gallop and no friction rub.   Murmur heard.  Pulmonary/Chest: Effort normal and breath sounds normal. No stridor. No respiratory distress. She has no wheezes. She has no rales.   Musculoskeletal: She exhibits no edema.   Neurological: She is alert and oriented to person, place, and time.   Skin: Skin is warm and dry. No rash noted. She is not diaphoretic. No erythema. No pallor.   Psychiatric: She has a normal mood and affect. Her behavior is normal. Judgment and thought content normal.   Nursing note and vitals reviewed.      Assessment/Plan   Kimi was seen today for follow-up.    Diagnoses and all orders for this visit:    Hospital discharge follow-up    Iron deficiency anemia, unspecified iron deficiency anemia type  -     CBC & Differential; Future    Gastrointestinal hemorrhage, unspecified gastrointestinal hemorrhage type    Anemia, unspecified type    Left carotid bruit    Murmur, cardiac    Tachycardia  Comments:  resolved      Fatigue, unspecified type  Comments:  greatly improved     Current outpatient and discharge medications have been reconciled for the patient.  Reviewed by: TESFAYE Kimble  She is advised to follow up with surgery next week as scheduled  Will continue on carafate, PPI and diflucan as prescribed at discharge from [facility]  Cbc repeated today and reveals hgb increased to 10.5  will need to have  repeated cmp and cbc in 1 month as well to maintain stability, monitor glucose   They are aware and are in agreement to this plan  All questions and concerns are addressed with understanding noted.   A1c in [facility] 5.7  Records from [facility] including labs and EGD results are reviewed

## 2019-12-04 LAB
FERRITIN SERPL-MCNC: 74.3 NG/ML (ref 13–150)
FOLATE SERPL-MCNC: 14.2 NG/ML (ref 4.78–24.2)
IRON 24H UR-MRATE: 42 MCG/DL (ref 37–145)
IRON SATN MFR SERPL: 7 % (ref 20–50)
T3FREE SERPL-MCNC: 3.32 PG/ML (ref 2–4.4)
T4 FREE SERPL-MCNC: 1.13 NG/DL (ref 0.93–1.7)
TIBC SERPL-MCNC: 597 MCG/DL (ref 298–536)
TRANSFERRIN SERPL-MCNC: 401 MG/DL (ref 200–360)
TSH SERPL DL<=0.05 MIU/L-ACNC: 3.08 UIU/ML (ref 0.27–4.2)
VIT B12 BLD-MCNC: 878 PG/ML (ref 211–946)

## 2019-12-06 DIAGNOSIS — E61.1 LOW SERUM IRON: Primary | ICD-10-CM

## 2019-12-23 ENCOUNTER — APPOINTMENT (OUTPATIENT)
Dept: ONCOLOGY | Facility: HOSPITAL | Age: 74
End: 2019-12-23

## 2019-12-23 ENCOUNTER — APPOINTMENT (OUTPATIENT)
Dept: ONCOLOGY | Facility: CLINIC | Age: 74
End: 2019-12-23

## 2020-01-06 RX ORDER — ATORVASTATIN CALCIUM 20 MG/1
20 TABLET, FILM COATED ORAL DAILY
Qty: 30 TABLET | Refills: 5 | Status: SHIPPED | OUTPATIENT
Start: 2020-01-06 | End: 2020-05-05

## 2020-03-09 DIAGNOSIS — D64.9 ANEMIA, UNSPECIFIED TYPE: Primary | ICD-10-CM

## 2020-04-17 ENCOUNTER — LAB (OUTPATIENT)
Dept: LAB | Facility: OTHER | Age: 75
End: 2020-04-17

## 2020-04-17 DIAGNOSIS — D64.9 ANEMIA, UNSPECIFIED TYPE: ICD-10-CM

## 2020-05-05 RX ORDER — ATORVASTATIN CALCIUM 20 MG/1
TABLET, FILM COATED ORAL
Qty: 30 TABLET | Refills: 0 | Status: SHIPPED | OUTPATIENT
Start: 2020-05-05 | End: 2020-06-29

## 2020-06-29 RX ORDER — ATORVASTATIN CALCIUM 20 MG/1
TABLET, FILM COATED ORAL
Qty: 30 TABLET | Refills: 0 | Status: SHIPPED | OUTPATIENT
Start: 2020-06-29 | End: 2020-09-21 | Stop reason: SDUPTHER

## 2020-09-21 ENCOUNTER — OFFICE VISIT (OUTPATIENT)
Dept: FAMILY MEDICINE CLINIC | Facility: CLINIC | Age: 75
End: 2020-09-21

## 2020-09-21 DIAGNOSIS — E78.2 MIXED HYPERLIPIDEMIA: Primary | Chronic | ICD-10-CM

## 2020-09-21 PROCEDURE — 99442 PR PHYS/QHP TELEPHONE EVALUATION 11-20 MIN: CPT | Performed by: NURSE PRACTITIONER

## 2020-09-21 RX ORDER — ATORVASTATIN CALCIUM 20 MG/1
20 TABLET, FILM COATED ORAL EVERY EVENING
Qty: 30 TABLET | Refills: 5 | Status: SHIPPED | OUTPATIENT
Start: 2020-09-21 | End: 2021-04-19

## 2020-09-21 RX ORDER — ATORVASTATIN CALCIUM 20 MG/1
TABLET, FILM COATED ORAL
Qty: 30 TABLET | Refills: 0 | OUTPATIENT
Start: 2020-09-21

## 2020-09-21 NOTE — PROGRESS NOTES
"Subjective   Kimi Buck is a 75 y.o. female. Patient here today with complaints of Med Refill  Patient here today for telehealth visit for recheck of hyperlipidemia and needing refills on atorvastatin.  She reports that she is tolerating medication well denies myalgias.  Is due for lipids to be drawn.  She continues to see Dr. Young in [place] for anemia and Dr. Anaya in [place] for \"mouth problems\".  Denies complaints today      There were no vitals filed for this visit.  There is no height or weight on file to calculate BMI.  Past Medical History:   Diagnosis Date   • Heart murmur    • Urinary tract infection    • Visual impairment      Hyperlipidemia  This is a chronic problem. The current episode started more than 1 year ago. There are no known factors aggravating her hyperlipidemia. Current antihyperlipidemic treatment includes statins, diet change and exercise. Compliance problems include adherence to exercise and adherence to diet.  Risk factors for coronary artery disease include dyslipidemia and post-menopausal.        The following portions of the patient's history were reviewed and updated as appropriate: allergies, current medications, past family history, past medical history, past social history, past surgical history and problem list.    Review of Systems   Constitutional: Negative.    HENT: Negative.    Eyes: Negative.    Respiratory: Negative.    Cardiovascular: Negative.    Gastrointestinal: Negative.    Endocrine: Negative.    Genitourinary: Negative.    Musculoskeletal: Negative.    Skin: Negative.    Allergic/Immunologic: Negative.    Neurological: Negative.    Hematological: Negative.    Psychiatric/Behavioral: Negative.        Objective   Physical Exam  Deferred, telephone visit  Assessment/Plan   Kimi was seen today for med refill.    Diagnoses and all orders for this visit:    Mixed hyperlipidemia  -     atorvastatin (LIPITOR) 20 MG tablet; Take 1 tablet by mouth Every Evening.  - "     Lipid panel; Future  -     Comprehensive metabolic panel; Future    You have chosen to receive care through a telephone visit. Do you consent to use a telephone visit for your medical care today? Yes  This visit has been rescheduled as a phone visit to comply with patient safety concerns in accordance with CDC recommendations. Total time of discussion was 18 minutes.  I will obtain CMP and lipids when fasting and will inform patient of results via phone  She is given refills on Lipitor as above  Follow-up in 6 months for recheck or sooner as needed  Previous labs are reviewed however she has not had lipids drawn since 11/2019  Patient aware and in agreement to this plan  All questions and concerns are addressed with understanding verbalized.

## 2020-09-25 ENCOUNTER — LAB (OUTPATIENT)
Dept: LAB | Facility: OTHER | Age: 75
End: 2020-09-25

## 2020-09-25 DIAGNOSIS — E78.2 MIXED HYPERLIPIDEMIA: Chronic | ICD-10-CM

## 2020-09-25 LAB
ALBUMIN SERPL-MCNC: 4.4 G/DL (ref 3.5–5)
ALBUMIN/GLOB SERPL: 1.6 G/DL (ref 1.1–1.8)
ALP SERPL-CCNC: 75 U/L (ref 38–126)
ALT SERPL W P-5'-P-CCNC: 22 U/L
ANION GAP SERPL CALCULATED.3IONS-SCNC: 7 MMOL/L (ref 5–15)
AST SERPL-CCNC: 32 U/L (ref 14–36)
BASOPHILS # BLD AUTO: 0.01 10*3/MM3 (ref 0–0.2)
BASOPHILS NFR BLD AUTO: 0.2 % (ref 0–1.5)
BILIRUB SERPL-MCNC: 0.6 MG/DL (ref 0.2–1.3)
BUN SERPL-MCNC: 11 MG/DL (ref 7–23)
BUN/CREAT SERPL: 15.5 (ref 7–25)
CALCIUM SPEC-SCNC: 9.7 MG/DL (ref 8.4–10.2)
CHLORIDE SERPL-SCNC: 101 MMOL/L (ref 101–112)
CHOLEST SERPL-MCNC: 160 MG/DL (ref 150–200)
CO2 SERPL-SCNC: 31 MMOL/L (ref 22–30)
CREAT SERPL-MCNC: 0.71 MG/DL (ref 0.52–1.04)
DEPRECATED RDW RBC AUTO: 43 FL (ref 37–54)
EOSINOPHIL # BLD AUTO: 0.13 10*3/MM3 (ref 0–0.4)
EOSINOPHIL NFR BLD AUTO: 2 % (ref 0.3–6.2)
ERYTHROCYTE [DISTWIDTH] IN BLOOD BY AUTOMATED COUNT: 13.4 % (ref 12.3–15.4)
GFR SERPL CREATININE-BSD FRML MDRD: 80 ML/MIN/1.73 (ref 39–90)
GLOBULIN UR ELPH-MCNC: 2.7 GM/DL (ref 2.3–3.5)
GLUCOSE SERPL-MCNC: 162 MG/DL (ref 70–99)
HCT VFR BLD AUTO: 44 % (ref 34–46.6)
HDLC SERPL-MCNC: 54 MG/DL (ref 40–59)
HGB BLD-MCNC: 14.9 G/DL (ref 12–15.9)
LDLC SERPL CALC-MCNC: 83 MG/DL
LDLC/HDLC SERPL: 1.53 {RATIO} (ref 0–3.22)
LYMPHOCYTES # BLD AUTO: 1.32 10*3/MM3 (ref 0.7–3.1)
LYMPHOCYTES NFR BLD AUTO: 20.2 % (ref 19.6–45.3)
MCH RBC QN AUTO: 30 PG (ref 26.6–33)
MCHC RBC AUTO-ENTMCNC: 33.9 G/DL (ref 31.5–35.7)
MCV RBC AUTO: 88.7 FL (ref 79–97)
MONOCYTES # BLD AUTO: 0.55 10*3/MM3 (ref 0.1–0.9)
MONOCYTES NFR BLD AUTO: 8.4 % (ref 5–12)
NEUTROPHILS NFR BLD AUTO: 4.53 10*3/MM3 (ref 1.7–7)
NEUTROPHILS NFR BLD AUTO: 69.2 % (ref 42.7–76)
PLATELET # BLD AUTO: 263 10*3/MM3 (ref 140–450)
PMV BLD AUTO: 10.8 FL (ref 6–12)
POTASSIUM SERPL-SCNC: 4.2 MMOL/L (ref 3.4–5)
PROT SERPL-MCNC: 7.1 G/DL (ref 6.3–8.6)
RBC # BLD AUTO: 4.96 10*6/MM3 (ref 3.77–5.28)
SODIUM SERPL-SCNC: 139 MMOL/L (ref 137–145)
TRIGL SERPL-MCNC: 116 MG/DL
VLDLC SERPL-MCNC: 23.2 MG/DL
WBC # BLD AUTO: 6.54 10*3/MM3 (ref 3.4–10.8)

## 2020-09-25 PROCEDURE — 85025 COMPLETE CBC W/AUTO DIFF WBC: CPT | Performed by: NURSE PRACTITIONER

## 2020-09-25 PROCEDURE — 80061 LIPID PANEL: CPT | Performed by: NURSE PRACTITIONER

## 2020-09-25 PROCEDURE — 80053 COMPREHEN METABOLIC PANEL: CPT | Performed by: NURSE PRACTITIONER

## 2020-09-25 PROCEDURE — 36415 COLL VENOUS BLD VENIPUNCTURE: CPT | Performed by: NURSE PRACTITIONER

## 2020-10-13 DIAGNOSIS — R73.09 ELEVATED GLUCOSE: Primary | ICD-10-CM

## 2020-10-23 ENCOUNTER — LAB (OUTPATIENT)
Dept: LAB | Facility: OTHER | Age: 75
End: 2020-10-23

## 2020-10-23 DIAGNOSIS — R73.09 ELEVATED GLUCOSE: ICD-10-CM

## 2020-10-23 LAB
BILIRUB UR QL STRIP: NEGATIVE
CLARITY UR: ABNORMAL
COLOR UR: YELLOW
GLUCOSE UR STRIP-MCNC: NEGATIVE MG/DL
HBA1C MFR BLD: 6.43 % (ref 4.8–5.6)
HGB UR QL STRIP.AUTO: NEGATIVE
KETONES UR QL STRIP: NEGATIVE
LEUKOCYTE ESTERASE UR QL STRIP.AUTO: NEGATIVE
NITRITE UR QL STRIP: NEGATIVE
PH UR STRIP.AUTO: <=5 [PH] (ref 5.5–8)
PROT UR QL STRIP: NEGATIVE
SP GR UR STRIP: >=1.03 (ref 1–1.03)
UROBILINOGEN UR QL STRIP: ABNORMAL

## 2020-10-23 PROCEDURE — 83036 HEMOGLOBIN GLYCOSYLATED A1C: CPT | Performed by: NURSE PRACTITIONER

## 2020-10-23 PROCEDURE — 36415 COLL VENOUS BLD VENIPUNCTURE: CPT | Performed by: NURSE PRACTITIONER

## 2020-10-23 PROCEDURE — 81003 URINALYSIS AUTO W/O SCOPE: CPT | Performed by: NURSE PRACTITIONER

## 2020-11-24 RX ORDER — PNEUMOCOCCAL VACCINE POLYVALENT 25; 25; 25; 25; 25; 25; 25; 25; 25; 25; 25; 25; 25; 25; 25; 25; 25; 25; 25; 25; 25; 25; 25 UG/.5ML; UG/.5ML; UG/.5ML; UG/.5ML; UG/.5ML; UG/.5ML; UG/.5ML; UG/.5ML; UG/.5ML; UG/.5ML; UG/.5ML; UG/.5ML; UG/.5ML; UG/.5ML; UG/.5ML; UG/.5ML; UG/.5ML; UG/.5ML; UG/.5ML; UG/.5ML; UG/.5ML; UG/.5ML; UG/.5ML
0.5 INJECTION, SOLUTION INTRAMUSCULAR; SUBCUTANEOUS ONCE
Qty: 0.5 ML | Refills: 0 | Status: SHIPPED | OUTPATIENT
Start: 2020-11-24 | End: 2020-11-24

## 2021-03-02 ENCOUNTER — PATIENT OUTREACH (OUTPATIENT)
Dept: PHARMACY | Facility: HOSPITAL | Age: 76
End: 2021-03-02

## 2021-03-02 NOTE — OUTREACH NOTE
Medication Adherence Call    Patient was called today to discuss medication adherence with atorvastatin, as the patient was identified as having care opportunities.     The patient did not answer. I will try again at a later date.    Iris Carter PharmD  03/02/21    Medication Adherence Call    Patient was called today to discuss medication adherence with atorvastatin, as the patient was identified as having care opportunities.     The patient denies issues with adherence and denies any barriers to receiving medications.    Iris Carter PharmD  03/04/21

## 2021-04-16 DIAGNOSIS — E78.2 MIXED HYPERLIPIDEMIA: Chronic | ICD-10-CM

## 2021-04-19 RX ORDER — ATORVASTATIN CALCIUM 20 MG/1
20 TABLET, FILM COATED ORAL EVERY EVENING
Qty: 60 TABLET | Refills: 1 | Status: SHIPPED | OUTPATIENT
Start: 2021-04-19 | End: 2021-07-15

## 2021-05-10 DIAGNOSIS — E78.5 HYPERLIPIDEMIA, UNSPECIFIED HYPERLIPIDEMIA TYPE: Primary | ICD-10-CM

## 2021-05-10 DIAGNOSIS — R79.9 ABNORMAL FINDING OF BLOOD CHEMISTRY, UNSPECIFIED: ICD-10-CM

## 2021-05-12 ENCOUNTER — LAB (OUTPATIENT)
Dept: LAB | Facility: OTHER | Age: 76
End: 2021-05-12

## 2021-05-12 LAB
ALBUMIN SERPL-MCNC: 3.9 G/DL (ref 3.5–5)
ALBUMIN/GLOB SERPL: 1.7 G/DL (ref 1.1–1.8)
ALP SERPL-CCNC: 65 U/L (ref 38–126)
ALT SERPL W P-5'-P-CCNC: 27 U/L
ANION GAP SERPL CALCULATED.3IONS-SCNC: 8 MMOL/L (ref 5–15)
AST SERPL-CCNC: 41 U/L (ref 14–36)
BASOPHILS # BLD AUTO: 0.02 10*3/MM3 (ref 0–0.2)
BASOPHILS NFR BLD AUTO: 0.4 % (ref 0–1.5)
BILIRUB SERPL-MCNC: 0.4 MG/DL (ref 0.2–1.3)
BUN SERPL-MCNC: 16 MG/DL (ref 7–23)
BUN/CREAT SERPL: 23.5 (ref 7–25)
CALCIUM SPEC-SCNC: 9.2 MG/DL (ref 8.4–10.2)
CHLORIDE SERPL-SCNC: 100 MMOL/L (ref 101–112)
CHOLEST SERPL-MCNC: 190 MG/DL (ref 150–200)
CO2 SERPL-SCNC: 27 MMOL/L (ref 22–30)
CREAT SERPL-MCNC: 0.68 MG/DL (ref 0.52–1.04)
DEPRECATED RDW RBC AUTO: 47.3 FL (ref 37–54)
EOSINOPHIL # BLD AUTO: 0.09 10*3/MM3 (ref 0–0.4)
EOSINOPHIL NFR BLD AUTO: 1.6 % (ref 0.3–6.2)
ERYTHROCYTE [DISTWIDTH] IN BLOOD BY AUTOMATED COUNT: 14.4 % (ref 12.3–15.4)
GFR SERPL CREATININE-BSD FRML MDRD: 84 ML/MIN/1.73 (ref 39–90)
GLOBULIN UR ELPH-MCNC: 2.3 GM/DL (ref 2.3–3.5)
GLUCOSE SERPL-MCNC: 172 MG/DL (ref 70–99)
HBA1C MFR BLD: 5.13 % (ref 4.8–5.6)
HCT VFR BLD AUTO: 34.4 % (ref 34–46.6)
HDLC SERPL-MCNC: 55 MG/DL (ref 40–59)
HGB BLD-MCNC: 11 G/DL (ref 12–15.9)
LDLC SERPL CALC-MCNC: 112 MG/DL
LDLC/HDLC SERPL: 1.99 {RATIO} (ref 0–3.22)
LYMPHOCYTES # BLD AUTO: 1.12 10*3/MM3 (ref 0.7–3.1)
LYMPHOCYTES NFR BLD AUTO: 20.4 % (ref 19.6–45.3)
MCH RBC QN AUTO: 29.9 PG (ref 26.6–33)
MCHC RBC AUTO-ENTMCNC: 32 G/DL (ref 31.5–35.7)
MCV RBC AUTO: 93.5 FL (ref 79–97)
MONOCYTES # BLD AUTO: 0.52 10*3/MM3 (ref 0.1–0.9)
MONOCYTES NFR BLD AUTO: 9.5 % (ref 5–12)
NEUTROPHILS NFR BLD AUTO: 3.75 10*3/MM3 (ref 1.7–7)
NEUTROPHILS NFR BLD AUTO: 68.1 % (ref 42.7–76)
PLATELET # BLD AUTO: 275 10*3/MM3 (ref 140–450)
PMV BLD AUTO: 11.6 FL (ref 6–12)
POTASSIUM SERPL-SCNC: 3.9 MMOL/L (ref 3.4–5)
PROT SERPL-MCNC: 6.2 G/DL (ref 6.3–8.6)
RBC # BLD AUTO: 3.68 10*6/MM3 (ref 3.77–5.28)
SODIUM SERPL-SCNC: 135 MMOL/L (ref 137–145)
TRIGL SERPL-MCNC: 128 MG/DL
VLDLC SERPL-MCNC: 23 MG/DL (ref 5–40)
WBC # BLD AUTO: 5.5 10*3/MM3 (ref 3.4–10.8)

## 2021-05-12 PROCEDURE — 84436 ASSAY OF TOTAL THYROXINE: CPT | Performed by: NURSE PRACTITIONER

## 2021-05-12 PROCEDURE — 84443 ASSAY THYROID STIM HORMONE: CPT | Performed by: NURSE PRACTITIONER

## 2021-05-12 PROCEDURE — 36415 COLL VENOUS BLD VENIPUNCTURE: CPT | Performed by: NURSE PRACTITIONER

## 2021-05-12 PROCEDURE — 80053 COMPREHEN METABOLIC PANEL: CPT | Performed by: NURSE PRACTITIONER

## 2021-05-12 PROCEDURE — 85025 COMPLETE CBC W/AUTO DIFF WBC: CPT | Performed by: NURSE PRACTITIONER

## 2021-05-12 PROCEDURE — 82607 VITAMIN B-12: CPT | Performed by: NURSE PRACTITIONER

## 2021-05-12 PROCEDURE — 84481 FREE ASSAY (FT-3): CPT | Performed by: NURSE PRACTITIONER

## 2021-05-12 PROCEDURE — 83036 HEMOGLOBIN GLYCOSYLATED A1C: CPT | Performed by: NURSE PRACTITIONER

## 2021-05-12 PROCEDURE — 80061 LIPID PANEL: CPT | Performed by: NURSE PRACTITIONER

## 2021-05-13 LAB
T3FREE SERPL-MCNC: 3.23 PG/ML (ref 2–4.4)
T4 SERPL-MCNC: 6.91 MCG/DL (ref 4.5–11.7)
TSH SERPL DL<=0.05 MIU/L-ACNC: 2.83 UIU/ML (ref 0.27–4.2)
VIT B12 BLD-MCNC: 1535 PG/ML (ref 211–946)

## 2021-07-15 DIAGNOSIS — E78.2 MIXED HYPERLIPIDEMIA: Chronic | ICD-10-CM

## 2021-07-15 RX ORDER — ATORVASTATIN CALCIUM 20 MG/1
20 TABLET, FILM COATED ORAL NIGHTLY
Qty: 60 TABLET | Refills: 0 | Status: SHIPPED | OUTPATIENT
Start: 2021-07-15 | End: 2021-09-13

## 2021-08-25 RX ORDER — ZOSTER VACCINE RECOMBINANT, ADJUVANTED 50 MCG/0.5
0.5 KIT INTRAMUSCULAR ONCE
Qty: 1 EACH | Refills: 1 | Status: SHIPPED | OUTPATIENT
Start: 2021-08-25 | End: 2021-08-25

## 2021-08-26 ENCOUNTER — LAB (OUTPATIENT)
Dept: LAB | Facility: OTHER | Age: 76
End: 2021-08-26

## 2021-08-26 ENCOUNTER — OFFICE VISIT (OUTPATIENT)
Dept: FAMILY MEDICINE CLINIC | Facility: CLINIC | Age: 76
End: 2021-08-26

## 2021-08-26 DIAGNOSIS — R30.0 DYSURIA: ICD-10-CM

## 2021-08-26 DIAGNOSIS — R30.0 DYSURIA: Primary | ICD-10-CM

## 2021-08-26 DIAGNOSIS — R35.0 INCREASED FREQUENCY OF URINATION: ICD-10-CM

## 2021-08-26 LAB
BILIRUB UR QL STRIP: NEGATIVE
CLARITY UR: CLEAR
COLOR UR: YELLOW
GLUCOSE UR STRIP-MCNC: NEGATIVE MG/DL
HGB UR QL STRIP.AUTO: NEGATIVE
KETONES UR QL STRIP: ABNORMAL
LEUKOCYTE ESTERASE UR QL STRIP.AUTO: NEGATIVE
NITRITE UR QL STRIP: NEGATIVE
PH UR STRIP.AUTO: 5.5 [PH] (ref 5.5–8)
PROT UR QL STRIP: NEGATIVE
SP GR UR STRIP: 1.02 (ref 1–1.03)
UROBILINOGEN UR QL STRIP: ABNORMAL

## 2021-08-26 PROCEDURE — 81003 URINALYSIS AUTO W/O SCOPE: CPT | Performed by: NURSE PRACTITIONER

## 2021-08-26 PROCEDURE — 99443 PR PHYS/QHP TELEPHONE EVALUATION 21-30 MIN: CPT | Performed by: NURSE PRACTITIONER

## 2021-08-26 RX ORDER — SULFAMETHOXAZOLE AND TRIMETHOPRIM 800; 160 MG/1; MG/1
1 TABLET ORAL 2 TIMES DAILY
Qty: 14 TABLET | Refills: 0 | Status: SHIPPED | OUTPATIENT
Start: 2021-08-26 | End: 2021-09-02

## 2021-08-26 NOTE — PROGRESS NOTES
Chief Complaint  Urinary Tract Infection    Subjective          Kimi Buck presents to [facility]  History of Present Illness  Pt here today for telehealth visit with complaints of mild dysuria and increased frequency of urination. She reports symptoms present since 2 days ago. She tested her urine with OTC test and tells me this was positive. She denies chills, fever or back pain. Has had some mild lower , mid abd pain.   Objective   Vital Signs:   There were no vitals taken for this visit.    Physical Exam   Deferred, telephone visit  Result Review :                 Assessment and Plan    Diagnoses and all orders for this visit:    1. Dysuria (Primary)  -     Urinalysis With Culture If Indicated -; Future    2. Increased frequency of urination    Other orders  -     sulfamethoxazole-trimethoprim (Bactrim DS) 800-160 MG per tablet; Take 1 tablet by mouth 2 (Two) Times a Day for 7 days.  Dispense: 14 tablet; Refill: 0    pt is treated with bactrim as above, UA / CX is obtained  She is advised to push fluids, water and cranberry juice  If symptoms persist/worsen she is asked to RTC for recheck  She is aware and is in agreement to this plan  All questions are addressed with understanding noted    I spent 22 minutes caring for Kimi on this date of service. This time includes time spent by me in the following activities:preparing for the visit, reviewing tests, counseling and educating the patient/family/caregiver, ordering medications, tests, or procedures and documenting information in the medical record  Follow Up   Return if symptoms worsen or fail to improve, for Recheck, or sooner as needed.  Patient was given instructions and counseling regarding her condition or for health maintenance advice. Please see specific information pulled into the AVS if appropriate.     You have chosen to receive care through a telephone visit. Do you consent to use a telephone visit for your medical  care today? Yes    This visit has been rescheduled as a phone visit to comply with patient safety concerns in accordance with CDC recommendations. Total time of discussion was 22 minutes.

## 2021-09-07 DIAGNOSIS — R30.0 DYSURIA: Primary | ICD-10-CM

## 2021-09-08 ENCOUNTER — LAB (OUTPATIENT)
Dept: LAB | Facility: OTHER | Age: 76
End: 2021-09-08

## 2021-09-08 ENCOUNTER — OFFICE VISIT (OUTPATIENT)
Dept: FAMILY MEDICINE CLINIC | Facility: CLINIC | Age: 76
End: 2021-09-08

## 2021-09-08 VITALS
SYSTOLIC BLOOD PRESSURE: 150 MMHG | HEIGHT: 64 IN | DIASTOLIC BLOOD PRESSURE: 72 MMHG | TEMPERATURE: 98.3 F | WEIGHT: 132.2 LBS | HEART RATE: 101 BPM | OXYGEN SATURATION: 99 % | BODY MASS INDEX: 22.57 KG/M2

## 2021-09-08 DIAGNOSIS — Z78.0 POSTMENOPAUSE: ICD-10-CM

## 2021-09-08 DIAGNOSIS — Z12.31 ENCOUNTER FOR SCREENING MAMMOGRAM FOR MALIGNANT NEOPLASM OF BREAST: ICD-10-CM

## 2021-09-08 DIAGNOSIS — E78.2 MIXED HYPERLIPIDEMIA: ICD-10-CM

## 2021-09-08 DIAGNOSIS — R07.89 TIGHTNESS IN CHEST: ICD-10-CM

## 2021-09-08 DIAGNOSIS — Z00.00 MEDICARE ANNUAL WELLNESS VISIT, SUBSEQUENT: Primary | ICD-10-CM

## 2021-09-08 DIAGNOSIS — R01.1 MURMUR, CARDIAC: ICD-10-CM

## 2021-09-08 DIAGNOSIS — N30.00 ACUTE CYSTITIS WITHOUT HEMATURIA: ICD-10-CM

## 2021-09-08 DIAGNOSIS — R30.0 DYSURIA: ICD-10-CM

## 2021-09-08 DIAGNOSIS — Z12.31 VISIT FOR SCREENING MAMMOGRAM: ICD-10-CM

## 2021-09-08 DIAGNOSIS — R03.0 ELEVATED BLOOD PRESSURE READING: ICD-10-CM

## 2021-09-08 LAB
BACTERIA UR QL AUTO: ABNORMAL /HPF
BILIRUB UR QL STRIP: NEGATIVE
CLARITY UR: ABNORMAL
COLOR UR: YELLOW
GLUCOSE UR STRIP-MCNC: NEGATIVE MG/DL
HGB UR QL STRIP.AUTO: NEGATIVE
HYALINE CASTS UR QL AUTO: ABNORMAL /LPF
KETONES UR QL STRIP: NEGATIVE
LEUKOCYTE ESTERASE UR QL STRIP.AUTO: ABNORMAL
MUCOUS THREADS URNS QL MICRO: ABNORMAL /HPF
NITRITE UR QL STRIP: NEGATIVE
PH UR STRIP.AUTO: <=5 [PH] (ref 5.5–8)
PROT UR QL STRIP: NEGATIVE
RBC # UR: ABNORMAL /HPF
REF LAB TEST METHOD: ABNORMAL
SP GR UR STRIP: >=1.03 (ref 1–1.03)
SQUAMOUS #/AREA URNS HPF: ABNORMAL /HPF
UROBILINOGEN UR QL STRIP: ABNORMAL
WBC UR QL AUTO: ABNORMAL /HPF

## 2021-09-08 PROCEDURE — 99214 OFFICE O/P EST MOD 30 MIN: CPT | Performed by: NURSE PRACTITIONER

## 2021-09-08 PROCEDURE — 87086 URINE CULTURE/COLONY COUNT: CPT | Performed by: NURSE PRACTITIONER

## 2021-09-08 PROCEDURE — G0439 PPPS, SUBSEQ VISIT: HCPCS | Performed by: NURSE PRACTITIONER

## 2021-09-08 PROCEDURE — 96160 PT-FOCUSED HLTH RISK ASSMT: CPT | Performed by: NURSE PRACTITIONER

## 2021-09-08 PROCEDURE — 1159F MED LIST DOCD IN RCRD: CPT | Performed by: NURSE PRACTITIONER

## 2021-09-08 PROCEDURE — 81001 URINALYSIS AUTO W/SCOPE: CPT | Performed by: NURSE PRACTITIONER

## 2021-09-08 PROCEDURE — 1126F AMNT PAIN NOTED NONE PRSNT: CPT | Performed by: NURSE PRACTITIONER

## 2021-09-08 RX ORDER — CEPHALEXIN 250 MG/5ML
500 POWDER, FOR SUSPENSION ORAL 2 TIMES DAILY
Qty: 140 ML | Refills: 0 | Status: SHIPPED | OUTPATIENT
Start: 2021-09-08 | End: 2021-09-15

## 2021-09-08 RX ORDER — FERROUS SULFATE 325(65) MG
325 TABLET ORAL
COMMUNITY

## 2021-09-08 NOTE — PROGRESS NOTES
The ABCs of the Annual Wellness Visit  Subsequent Medicare Wellness Visit    Chief Complaint   Patient presents with   • Medicare Wellness-subsequent   • Urinary Tract Infection     Patient states the week she took her medicine she experienced bloating, nausea.      Subjective    History of Present Illness:  Kimi Buck is a 76 y.o. female who presents for a Subsequent Medicare Wellness Visit.    The following portions of the patient's history were reviewed and   updated as appropriate: allergies, current medications, past family history, past medical history, past social history, past surgical history and problem list.     Compared to one year ago, the patient feels her physical   health is worse.    Compared to one year ago, the patient feels her mental   health is the same.    Recent Hospitalizations:  She was not admitted to the hospital during the last year.       Current Medical Providers:  Patient Care Team:  Bhakti Tran APRN as PCP - General (Family Medicine)    Outpatient Medications Prior to Visit   Medication Sig Dispense Refill   • aspirin 81 MG chewable tablet Chew 81 mg Daily.     • atorvastatin (LIPITOR) 20 MG tablet Take 1 tablet by mouth Every Night. 60 tablet 0   • ferrous sulfate 325 (65 FE) MG tablet Take 325 mg by mouth.       No facility-administered medications prior to visit.       No opioid medication identified on active medication list. I have reviewed chart for other potential  high risk medication/s and harmful drug interactions in the elderly.          Aspirin is on active medication list. Aspirin use is indicated based on review of current medical condition/s. Pros and cons of this therapy have been discussed today. Benefits of this medication outweigh potential harm.  Patient has been encouraged to continue taking this medication.  .      Patient Active Problem List   Diagnosis   • Abnormal CXR   • History of tobacco use   • Murmur, cardiac   • Dysphagia   • Left carotid bruit  "    Advance Care Planning   Advance Directive is not on file.  ACP discussion was declined by the patient. Patient does not have an advance directive, declines further assistance.          Objective       Vitals:    09/08/21 0824   BP: 150/72   Pulse: 101   Temp: 98.3 °F (36.8 °C)   SpO2: 99%   Weight: 60 kg (132 lb 3.2 oz)   Height: 161.3 cm (63.5\")   PainSc: 0-No pain     BMI Readings from Last 1 Encounters:   09/08/21 23.05 kg/m²   BMI is within normal parameters. No follow-up required.    Does the patient have evidence of cognitive impairment? No    Physical Exam            HEALTH RISK ASSESSMENT    Smoking Status:  Social History     Tobacco Use   Smoking Status Former Smoker   Smokeless Tobacco Never Used     Alcohol Consumption:  Social History     Substance and Sexual Activity   Alcohol Use No     Fall Risk Screen:    JOSEPADI Fall Risk Assessment was completed, and patient is at LOW risk for falls.Assessment completed on:9/8/2021    Depression Screening:  PHQ-2/PHQ-9 Depression Screening 9/8/2021   Little interest or pleasure in doing things 0   Feeling down, depressed, or hopeless 0   Total Score 0       Health Habits and Functional and Cognitive Screening:  Functional & Cognitive Status 9/8/2021   Do you have difficulty preparing food and eating? No   Do you have difficulty bathing yourself, getting dressed or grooming yourself? No   Do you have difficulty using the toilet? No   Do you have difficulty moving around from place to place? No   Do you have trouble with steps or getting out of a bed or a chair? No   Current Diet Well Balanced Diet   Dental Exam Up to date   Eye Exam Up to date   Exercise (times per week) 2 times per week   Current Exercises Include Walking   Current Exercise Activities Include -   Do you need help using the phone?  No   Are you deaf or do you have serious difficulty hearing?  No   Do you need help with transportation? No   Do you need help shopping? No   Do you need help " preparing meals?  No   Do you need help with housework?  No   Do you need help with laundry? No   Do you need help taking your medications? No   Do you need help managing money? No   Do you ever drive or ride in a car without wearing a seat belt? No   Have you felt unusual stress, anger or loneliness in the last month? No   Who do you live with? Spouse   If you need help, do you have trouble finding someone available to you? No   Have you been bothered in the last four weeks by sexual problems? No   Do you have difficulty concentrating, remembering or making decisions? No       Age-appropriate Screening Schedule:  Refer to the list below for future screening recommendations based on patient's age, sex and/or medical conditions. Orders for these recommended tests are listed in the plan section. The patient has been provided with a written plan.    Health Maintenance   Topic Date Due   • ZOSTER VACCINE (1 of 2) Never done   • DXA SCAN  06/19/2019   • MAMMOGRAM  12/18/2020   • INFLUENZA VACCINE  10/01/2021   • LIPID PANEL  05/12/2022   • TDAP/TD VACCINES (3 - Td or Tdap) 06/06/2027              Assessment/Plan     CMS Preventative Services Quick Reference  Risk Factors Identified During Encounter  Cardiovascular Disease  Immunizations Discussed/Encouraged (specific Immunizations; Shingrix and COVID19  Inactivity/Sedentary  Polypharmacy  The above risks/problems have been discussed with the patient.  Follow up actions/plans if indicated are seen below in the Assessment/Plan Section.  Pertinent information has been shared with the patient in the After Visit Summary.    Diagnoses and all orders for this visit:    1. Medicare annual wellness visit, subsequent (Primary)    2. Acute cystitis without hematuria    3. Encounter for screening mammogram for malignant neoplasm of breast  -     Mammo Screening Digital Tomosynthesis Bilateral With CAD; Future    4. Postmenopause  -     DEXA Bone Density Axial; Future    5. Tightness  in chest  -     Ambulatory Referral to Cardiology    6. Murmur, cardiac  -     Ambulatory Referral to Cardiology    7. Visit for screening mammogram    8. Mixed hyperlipidemia  -     Lipid panel; Future  -     CBC & Differential; Future  -     Comprehensive metabolic panel; Future  -     Ambulatory Referral to Cardiology    9. Elevated blood pressure reading  -     Lipid panel; Future  -     CBC & Differential; Future  -     Comprehensive metabolic panel; Future  -     Ambulatory Referral to Cardiology    Other orders  -     Zoster Vac Recomb Adjuvanted 50 MCG/0.5ML reconstituted suspension; Inject 0.5 mL into the appropriate muscle as directed by prescriber 1 (One) Time for 1 dose.  Dispense: 1 each; Refill: 1  -     cephALEXin (KEFLEX) 250 MG/5ML suspension; Take 10 mL by mouth 2 (Two) Times a Day for 7 days.  Dispense: 140 mL; Refill: 0        Follow Up:   Return if symptoms worsen or fail to improve, for Recheck, or sooner as needed.     An After Visit Summary and PPPS were given to the patient.    I spent 44 minutes caring for Kimi on this date of service. This time includes time spent by me in the following activities:preparing for the visit, reviewing tests, performing a medically appropriate examination and/or evaluation , counseling and educating the patient/family/caregiver, ordering medications, tests, or procedures, referring and communicating with other health care professionals  and documenting information in the medical record

## 2021-09-08 NOTE — PROGRESS NOTES
"Chief Complaint  Medicare Wellness-subsequent and Urinary Tract Infection (Patient states the week she took her medicine she experienced bloating, nausea.)    Subjective          The patient presents today for UTI symptoms and Medicare wellness. She confirms that she took the Bactrim /600 mg per tablet that was prescribed at her telehealth visit on 08/26/2021 and it caused her to feel nauseated with reflux for the entire week. She completed the course of the prescription and her nausea and reflux have resolved. She is still experiencing an increased frequency of urination at night, but has not experienced burning with urination. She has not noticed any blood in her urine. A urinalysis was received this morning. She denies fever, chills, nausea, or vomiting other than while taking the Bactrim DS.    Her blood pressure was elevated this morning at 150 over 72 and her heart rate was 101. She has noticed some unusual feelings in her medial chest accompanied by fatigue with exertion, but denies that it feels like pain or shortness of breath. Describes the discomfort as a \"tightness\" in her chest. She suspects it may be due to a lack of exercise. No family history of heart disease. No history of tobacco use. She does have a history of high cholesterol which she is taking medication for. She has an appointment today with Dr. Young.    In addition, the patient states that she does not feel like she is ever relaxed. She also has a history of a heart murmur. She is not currently followed by a cardiologist.    The patient is due for mammogram, bone density screening, and the shingles vaccine. She was advised by her pharmacist to wait to take the shingles vaccine until she had discontinued some of her other medication. She has had her COVID-19 vaccines. Her 2nd dose was less than 6 months ago. She has not been in the hospital overnight within the past year. She is taking aspirin. She feels like she is out-of-shape " "compared to this time last year. The patient does not have an advanced directive or living will nor does she want information on it.     Kimi Buck presents to [facility]  History of Present Illness    Objective   Vital Signs:   /72   Pulse 101   Temp 98.3 °F (36.8 °C)   Ht 161.3 cm (63.5\")   Wt 60 kg (132 lb 3.2 oz)   SpO2 99%   BMI 23.05 kg/m²     Physical Exam  Vitals and nursing note reviewed.   Constitutional:       General: She is not in acute distress.     Appearance: Normal appearance. She is normal weight. She is not ill-appearing, toxic-appearing or diaphoretic.   HENT:      Head: Normocephalic and atraumatic.   Cardiovascular:      Rate and Rhythm: Regular rhythm. Tachycardia present.      Heart sounds: Murmur heard.   No friction rub. No gallop.       Comments: Slightly tachycardic in the low 100's.  Pulmonary:      Effort: Pulmonary effort is normal. No respiratory distress.      Breath sounds: Normal breath sounds. No stridor. No wheezing, rhonchi or rales.   Abdominal:      Comments: No complaints of abdominal pain.   Musculoskeletal:      Comments: No back pain.   Skin:     General: Skin is warm and dry.   Neurological:      Mental Status: She is alert and oriented to person, place, and time.   Psychiatric:         Mood and Affect: Mood normal.         Behavior: Behavior normal.         Thought Content: Thought content normal.         Judgment: Judgment normal.        Result Review :     Common labs    Common Labsle 9/25/20 9/25/20 9/25/20 10/23/20 5/12/21 5/12/21 5/12/21 5/12/21    0917 0917 0917  0916 0916 0916 0916   Glucose   162 (A)  172 (A)      BUN   11  16      Creatinine   0.71  0.68      eGFR Non African Am   80  84      Sodium   139  135 (A)      Potassium   4.2  3.9      Chloride   101  100 (A)      Calcium   9.7  9.2      Albumin   4.40  3.90      Total Bilirubin   0.6  0.4      Alkaline Phosphatase   75  65      AST " (SGOT)   32  41 (A)      ALT (SGPT)   22  27      WBC 6.54      5.50    Hemoglobin 14.9      11.0 (A)    Hematocrit 44.0      34.4    Platelets 263      275    Total Cholesterol  160    190     Triglycerides  116    128     HDL Cholesterol  54    55     LDL Cholesterol   83    112 (A)     Hemoglobin A1C    6.43 (A)    5.13   (A) Abnormal value            CMP    CMP 9/25/20 5/12/21   Glucose 162 (A) 172 (A)   BUN 11 16   Creatinine 0.71 0.68   eGFR Non African Am 80 84   Sodium 139 135 (A)   Potassium 4.2 3.9   Chloride 101 100 (A)   Calcium 9.7 9.2   Albumin 4.40 3.90   Total Bilirubin 0.6 0.4   Alkaline Phosphatase 75 65   AST (SGOT) 32 41 (A)   ALT (SGPT) 22 27   (A) Abnormal value            CBC    CBC 9/25/20 5/12/21   WBC 6.54 5.50   RBC 4.96 3.68 (A)   Hemoglobin 14.9 11.0 (A)   Hematocrit 44.0 34.4   MCV 88.7 93.5   MCH 30.0 29.9   MCHC 33.9 32.0   RDW 13.4 14.4   Platelets 263 275   (A) Abnormal value            CBC w/diff    CBC w/Diff 9/25/20 5/12/21   WBC 6.54 5.50   RBC 4.96 3.68 (A)   Hemoglobin 14.9 11.0 (A)   Hematocrit 44.0 34.4   MCV 88.7 93.5   MCH 30.0 29.9   MCHC 33.9 32.0   RDW 13.4 14.4   Platelets 263 275   Neutrophil Rel % 69.2 68.1   Lymphocyte Rel % 20.2 20.4   Monocyte Rel % 8.4 9.5   Eosinophil Rel % 2.0 1.6   Basophil Rel % 0.2 0.4   (A) Abnormal value            Lipid Panel    Lipid Panel 9/25/20 5/12/21   Total Cholesterol 160 190   Triglycerides 116 128   HDL Cholesterol 54 55   VLDL Cholesterol 23.2 23   LDL Cholesterol  83 112 (A)   LDL/HDL Ratio 1.53 1.99   (A) Abnormal value            TSH    TSH 5/12/21   TSH 2.830                     Assessment and Plan    Diagnoses and all orders for this visit:    1. Medicare annual wellness visit, subsequent (Primary)    2. Acute cystitis without hematuria    3. Encounter for screening mammogram for malignant neoplasm of breast  -     Mammo Screening Digital Tomosynthesis Bilateral With CAD; Future    4. Postmenopause  -     DEXA Bone Density  Axial; Future    5. Tightness in chest  -     Ambulatory Referral to Cardiology    6. Murmur, cardiac  -     Ambulatory Referral to Cardiology    7. Visit for screening mammogram    8. Mixed hyperlipidemia  -     Lipid panel; Future  -     CBC & Differential; Future  -     Comprehensive metabolic panel; Future  -     Ambulatory Referral to Cardiology    9. Elevated blood pressure reading  -     Lipid panel; Future  -     CBC & Differential; Future  -     Comprehensive metabolic panel; Future  -     Ambulatory Referral to Cardiology    Other orders  -     Zoster Vac Recomb Adjuvanted 50 MCG/0.5ML reconstituted suspension; Inject 0.5 mL into the appropriate muscle as directed by prescriber 1 (One) Time for 1 dose.  Dispense: 1 each; Refill: 1  -     cephALEXin (KEFLEX) 250 MG/5ML suspension; Take 10 mL by mouth 2 (Two) Times a Day for 7 days.  Dispense: 140 mL; Refill: 0      Medicare wellness is completed. Health maintenance is reviewed. Shingrix is ordered and she will be advised to separate Shingrix and her 3rd COVID-19 injections by at least 2 weeks. Urinalysis was obtained and cultures are pending. The patient is informed of urinalysis results in the office and advised to increase clear fluids, including water and cranberry juice. She is requesting liquid medication. We will prescribe Keflex liquid as above. She has allergy to PENICILLIN but has had cefprozil in the past and tolerated it well, she believes. Labs will be obtained when fasting and she will be informed of results. She is actually going to take these orders with her to [facility] because she is seeing another provider this afternoon at that location and will have to have labs done there as well. She intends on having the labs that I have ordered at [facility] as well and will have them fax us the results. Bone density and mammogram are ordered for her to schedule at her convenience.     The patient is referred on to cardiology, Dr. Hercules for further evaluation and  treatment of tachycardia, heart murmur, and chest tightness with exertion. She will return here if her symptoms should persist or worsen to to emergency room if an emergent condition arises. Her blood pressure was rechecked at 160 over 70.    All questions and concerns are addressed with understanding noted. The patient is in agreement to above plan.    I spent 44 minutes caring for Kimi on this date of service. This time includes time spent by me in the following activities:preparing for the visit, reviewing tests, performing a medically appropriate examination and/or evaluation , counseling and educating the patient/family/caregiver, ordering medications, tests, or procedures, referring and communicating with other health care professionals  and documenting information in the medical record  Follow Up   Return if symptoms worsen or fail to improve, for Recheck, or sooner as needed.  Patient was given instructions and counseling regarding her condition or for health maintenance advice. Please see specific information pulled into the AVS if appropriate.     Transcribed from ambient dictation for TESFAYE Vargas by Brittney Polk.  09/08/21   10:38 CDT    I have personally performed the services described in this document as transcribed by the above individual, and it is both accurate and complete.  TESFAYE Vargas  9/8/2021  12:33 CDT

## 2021-09-09 LAB — BACTERIA SPEC AEROBE CULT: NO GROWTH

## 2021-09-13 DIAGNOSIS — E78.2 MIXED HYPERLIPIDEMIA: Chronic | ICD-10-CM

## 2021-09-13 RX ORDER — ATORVASTATIN CALCIUM 20 MG/1
TABLET, FILM COATED ORAL
Qty: 60 TABLET | Refills: 1 | Status: SHIPPED | OUTPATIENT
Start: 2021-09-13 | End: 2023-03-22

## 2021-09-16 ENCOUNTER — OFFICE VISIT (OUTPATIENT)
Dept: FAMILY MEDICINE CLINIC | Facility: CLINIC | Age: 76
End: 2021-09-16

## 2021-09-16 VITALS
SYSTOLIC BLOOD PRESSURE: 134 MMHG | WEIGHT: 130.8 LBS | DIASTOLIC BLOOD PRESSURE: 78 MMHG | HEIGHT: 64 IN | BODY MASS INDEX: 22.33 KG/M2 | OXYGEN SATURATION: 98 % | HEART RATE: 102 BPM

## 2021-09-16 DIAGNOSIS — R10.2 ABDOMINAL PAIN, SUPRAPUBIC: Primary | ICD-10-CM

## 2021-09-16 DIAGNOSIS — R07.9 EXERTIONAL CHEST PAIN: ICD-10-CM

## 2021-09-16 DIAGNOSIS — R01.1 MURMUR, CARDIAC: ICD-10-CM

## 2021-09-16 PROCEDURE — 99214 OFFICE O/P EST MOD 30 MIN: CPT | Performed by: NURSE PRACTITIONER

## 2021-09-16 NOTE — PROGRESS NOTES
"Chief Complaint  Abdominal Pain (lower stomach pain )    Subjective          The patient presents today with complaints of lower abdominal pain that has actually improved since this morning, after antibiotic treatment for UTI a couple weeks ago. She states that she thought it may be a yeast infection from the antibiotics, but she does not have any itching. She has an intermittent discomfort very low in her pelvic area. It was present this morning, but resolved after a bowel movement and has not returned today. She denies pain or burning with urination. She used a urinalysis self-test from [facility] and it was negative for infection.    In addition, she mopped the floor this morning and has felt fatigued ever since. She is scheduled for an echocardiogram and appointment with Dr. Hercules on 09/24/2021 and has a known heart murmur. Does still have exertional chest tightness she has noticed konstantin after mopping her floor at home.  In addition, the patient recently had labs done by Dr. Young to check her iron levels and has been started on transfusions.    Kimi Buck presents to [facility]  History of Present Illness    Objective   Vital Signs:   /78   Pulse 102   Ht 161.3 cm (63.5\")   Wt 59.3 kg (130 lb 12.8 oz)   SpO2 98%   BMI 22.81 kg/m²     Physical Exam  Vitals and nursing note reviewed.   Constitutional:       General: She is not in acute distress.     Appearance: Normal appearance. She is not ill-appearing, toxic-appearing or diaphoretic.   HENT:      Head: Normocephalic and atraumatic.   Cardiovascular:      Rate and Rhythm: Normal rate and regular rhythm.      Pulses: Normal pulses.      Heart sounds: Murmur heard.   No friction rub. No gallop.       Comments: No ectopy is noted.  Pulmonary:      Effort: Pulmonary effort is normal. No respiratory distress.      Breath sounds: Normal breath sounds. No stridor. No wheezing, rhonchi or rales. "   Abdominal:      General: Abdomen is flat. Bowel sounds are normal. There is no distension.      Palpations: Abdomen is soft. There is no mass.      Tenderness: There is no abdominal tenderness. There is no guarding.   Skin:     General: Skin is warm and dry.   Neurological:      Mental Status: She is alert and oriented to person, place, and time.   Psychiatric:         Mood and Affect: Mood normal.         Behavior: Behavior normal.         Thought Content: Thought content normal.         Judgment: Judgment normal.        Result Review :        CBC AND DIFFERENTIAL (09/09/2021 00:12 EDT)  IRON PROFILE (09/09/2021 00:12 EDT)  FERRITIN (09/09/2021 00:12 EDT)            Assessment and Plan    Diagnoses and all orders for this visit:    1. Abdominal pain, suprapubic (Primary)  Comments:  resolved      2. Murmur, cardiac    3. Exertional chest pain  Comments:  follow up with dr burns as scheduled next week, ER sooner with problems     She will let me know if her complaints of abdominal pain return. She will follow up as scheduled for chronic conditions or as needed. She will see Dr. Burns as scheduled on 09/24/2021 and follow up with Dr. Young as well. I have reviewed Dr. Young' lab results with the patient today and she states that she has already received 1 transfusion. She will receive another 1 next week. Presumably, this is a blood transfusion, but could be iron infusion. I am uncertain and the patient is unaware as well. [facility] lab will be contacted to see if she did in face have the lipid, CBC, and CMP that were ordered by me last time. If so, we will get these results and inform her via phone of those results. All questions and concerns are addressed with understanding noted. The patient is in agreement to above plan.    I spent 22 minutes caring for Kimi on this date of service. This time includes time spent by me in the following activities:preparing for the visit, reviewing tests, performing a medically  appropriate examination and/or evaluation , counseling and educating the patient/family/caregiver and documenting information in the medical record  Follow Up   Return if symptoms worsen or fail to improve, for or sooner as needed, Recheck.  Patient was given instructions and counseling regarding her condition or for health maintenance advice. Please see specific information pulled into the AVS if appropriate.     Transcribed from ambient dictation for TESFAYE Vargas by Brittney Polk.  09/16/21   13:41 CDT    I have personally performed the services described in this document as transcribed by the above individual, and it is both accurate and complete.  TESFAYE Vargas  9/16/2021  14:19 CDT

## 2021-09-24 ENCOUNTER — OFFICE VISIT (OUTPATIENT)
Dept: CARDIOLOGY | Facility: CLINIC | Age: 76
End: 2021-09-24

## 2021-09-24 VITALS
HEART RATE: 108 BPM | BODY MASS INDEX: 22.2 KG/M2 | SYSTOLIC BLOOD PRESSURE: 162 MMHG | OXYGEN SATURATION: 98 % | DIASTOLIC BLOOD PRESSURE: 100 MMHG | WEIGHT: 130 LBS | HEIGHT: 64 IN

## 2021-09-24 DIAGNOSIS — R01.1 MURMUR, CARDIAC: ICD-10-CM

## 2021-09-24 DIAGNOSIS — R07.9 CHEST PAIN, UNSPECIFIED TYPE: Primary | ICD-10-CM

## 2021-09-24 PROCEDURE — 93000 ELECTROCARDIOGRAM COMPLETE: CPT | Performed by: INTERNAL MEDICINE

## 2021-09-24 PROCEDURE — 99203 OFFICE O/P NEW LOW 30 MIN: CPT | Performed by: INTERNAL MEDICINE

## 2021-09-24 RX ORDER — DILTIAZEM HYDROCHLORIDE 180 MG/1
180 CAPSULE, COATED, EXTENDED RELEASE ORAL DAILY
Qty: 30 CAPSULE | Refills: 11 | Status: SHIPPED | OUTPATIENT
Start: 2021-09-24

## 2021-09-24 NOTE — PROGRESS NOTES
[facility] Cardiology  OFFICE CONSULT NOTE    Patient Name: Kimi Buck  Age/Sex: 76 y.o. female  : 1945  MRN: [id number]      Referring Provider: Bhakti Tran, APRN  [address] [place] [address] [place] [address]        Chief Complaint: Heart murmur, chest pain    History of Present Illness:  Kimi Buck is a 76 y.o. female lady who has had a long history of a heart murmur. Looking back on her notes this is been there for the time that epic is been in the [facility] system and she says she has had it since childhood. She has a remembrance of the possibly an echo being done but there is none in the chart and probably was done at one of my associates office when they were in private practice. She is noticed chest discomfort. She says she notices it when she does things. She describes it as somewhat of a tight feeling. She is anemic and is undergoing iron transfusion. She says she just wants to feel better. Has a history of hyperlipidemia.    Last Echo: Questionable 5 to 7 years ago    Last Cath: None      Past Medical History:  Past Medical History:   Diagnosis Date   • Heart murmur    • Urinary tract infection    • Visual impairment        PAST SURGERY   Past Surgical History:   Procedure Laterality Date   • COLONOSCOPY     • ENDOSCOPY N/A 2018    Procedure: ESOPHAGOGASTRODUODENOSCOPY possible dilation urgent;  Surgeon: Jam Mccracken MD;  Location: [facility] ENDOSCOPY;  Service: Gastroenterology   • EYE SURGERY     • VARICOSE VEIN SURGERY         Home Medications:      Current Outpatient Medications:   •  aspirin 81 MG chewable tablet, Chew 81 mg Daily., Disp: , Rfl:   •  atorvastatin (LIPITOR) 20 MG tablet, TAKE 1 TABLET BY MOUTH EVERY EVENING. [MUST HAVE APPOINTMENT FOR ADDITIONAL REFILLS], Disp: 60 tablet, Rfl: 1  •  dilTIAZem CD (CARDIZEM CD) 180 MG 24 hr capsule, Take 1 capsule by mouth Daily., Disp: 30 capsule, Rfl: 11  •  ferrous sulfate 325 (65 FE) MG tablet, Take 325 mg  by mouth., Disp: , Rfl:     Allergies:  Allergies   Allergen Reactions   • Penicillins Itching        Social History:  Social History     Socioeconomic History   • Marital status:      Spouse name: Not on file   • Number of children: Not on file   • Years of education: Not on file   • Highest education level: Not on file   Tobacco Use   • Smoking status: Former Smoker   • Smokeless tobacco: Never Used   Substance and Sexual Activity   • Alcohol use: No   • Drug use: No   • Sexual activity: Yes        Family History:  Family History   Problem Relation Age of Onset   • Cancer Mother    • COPD Mother    • Vision loss Mother    • Vision loss Father          Review of Systems:   Constitution: No chills, no rigors, no unexplained weight loss or weight gain    Eyes:  No diplopia, no blurred vision, no loss of vision, conjunctiva is pink and sclera is anicteric    ENT:  No tinnitus, no otorrhea, no epistaxis, no sore throat   Respiratory: No cough, no hemoptysis    Cardiovascular:  As mentioned in HPI    Gastrointestinal: No nausea, no vomiting, no hematemesis, no diarrhea or constipation, no melena    Genitourinary: No frequency of dysuria no hematuria    Integument: positive for  No pruritis and  no skin rash    Hematologic / Lymphatic: No excessive bleeding, easy bruising, fatigue, lymphadenopathy and petechiae    Musculoskeletal: No joint pain, joint stiffness, joint swelling, muscle pain, muscle weakness and neck pain    Neurological: No dizziness, headaches, light headedness, seizures and vertigo or stroke    Behavioral/Psych: No depression, or bipolar disorder    Endocrine: no h/o diabetes, hyperlipidemia or thyroid problems        Vitals:    09/24/21 1002   BP: 162/100   Pulse: 108   SpO2: 98%       Body mass index is 22.67 kg/m².          Physical Exam:   General Appearance:    Alert, oriented, cooperative, in no acute distress     Head:    Normocephalic, atraumatic, without obvious abnormality     Eyes:             Lids and lashes normal, conjunctivae and sclerae normal, no   icterus, no pallor     Ears:    Ears appear intact with no abnormalities noted     Throat:   Mucous membranes pink and moist     Neck:   Supple, trachea midline, no carotid bruit, no JVD     Lungs:     Air enty equal,  Clear to auscultation, respirations regular, Unlabored. No wheezes, rales, rhonchi    Heart:    Regular rhythm and normal rate, normal S1 and S2, he has a soft blowing almost continuous murmur heard in the entire precordium but along the left sternal border and actually could not be transmitted to the back. This however could be to her's body habitus being thin.       no gallop,      Abdomen:     Normal bowel sounds, no masses, liver and spleen nonpalpable, soft, non-tender, non-distended, no guarding, no rebound tenderness       Extremities:   Moves all extremities well, no edema, no cyanosis, no              Redness, no clubbing     Pulses:   Pulses palpable and equal bilaterally       Neurologic:   Alert and oriented to person, place, and time. No focal neurological deficits       Lab Review:     Lab on 09/08/2021   Component Date Value Ref Range Status   • Color, UA 09/08/2021 Yellow  Yellow, Straw Final   • Appearance, UA 09/08/2021 Slightly Cloudy* Clear Final   • pH, UA 09/08/2021 <=5.0* 5.5 - 8.0 Final   • Specific Gravity, UA 09/08/2021 >=1.030  1.005 - 1.030 Final   • Glucose, UA 09/08/2021 Negative  Negative Final   • Ketones, UA 09/08/2021 Negative  Negative Final   • Bilirubin, UA 09/08/2021 Negative  Negative Final   • Blood, UA 09/08/2021 Negative  Negative Final   • Protein, UA 09/08/2021 Negative  Negative Final   • Leuk Esterase, UA 09/08/2021 Small (1+)* Negative Final   • Nitrite, UA 09/08/2021 Negative  Negative Final   • Urobilinogen, UA 09/08/2021 0.2 E.U./dL  0.2 - 1.0 E.U./dL Final   • RBC, UA 09/08/2021 0-2* None Seen /HPF Final   • WBC, UA 09/08/2021 6-12* None Seen /HPF Final   • Bacteria, UA 09/08/2021  1+* None Seen /HPF Final   • Squamous Epithelial Cells, UA 09/08/2021 3-6* None Seen, 0-2 /HPF Final   • Hyaline Casts, UA 09/08/2021 None Seen  None Seen /LPF Final   • Mucus, UA 09/08/2021 Moderate/2+* None Seen, Trace /HPF Final   • Methodology 09/08/2021 Manual Light Microscopy   Final   • Urine Culture 09/08/2021 No growth   Final   Lab on 08/26/2021   Component Date Value Ref Range Status   • Color, UA 08/26/2021 Yellow  Yellow, Straw Final   • Appearance, UA 08/26/2021 Clear  Clear Final   • pH, UA 08/26/2021 5.5  5.5 - 8.0 Final   • Specific Gravity, UA 08/26/2021 1.025  1.005 - 1.030 Final   • Glucose, UA 08/26/2021 Negative  Negative Final   • Ketones, UA 08/26/2021 Trace* Negative Final   • Bilirubin, UA 08/26/2021 Negative  Negative Final   • Blood, UA 08/26/2021 Negative  Negative Final   • Protein, UA 08/26/2021 Negative  Negative Final   • Leuk Esterase, UA 08/26/2021 Negative  Negative Final   • Nitrite, UA 08/26/2021 Negative  Negative Final   • Urobilinogen, UA 08/26/2021 0.2 E.U./dL  0.2 - 1.0 E.U./dL Final   Orders Only on 05/10/2021   Component Date Value Ref Range Status   • Glucose 05/12/2021 172* 70 - 99 mg/dL Final   • BUN 05/12/2021 16  7 - 23 mg/dL Final   • Creatinine 05/12/2021 0.68  0.52 - 1.04 mg/dL Final   • Sodium 05/12/2021 135* 137 - 145 mmol/L Final   • Potassium 05/12/2021 3.9  3.4 - 5.0 mmol/L Final   • Chloride 05/12/2021 100* 101 - 112 mmol/L Final   • CO2 05/12/2021 27.0  22.0 - 30.0 mmol/L Final   • Calcium 05/12/2021 9.2  8.4 - 10.2 mg/dL Final   • Total Protein 05/12/2021 6.2* 6.3 - 8.6 g/dL Final   • Albumin 05/12/2021 3.90  3.50 - 5.00 g/dL Final   • ALT (SGPT) 05/12/2021 27  <=35 U/L Final   • AST (SGOT) 05/12/2021 41* 14 - 36 U/L Final   • Alkaline Phosphatase 05/12/2021 65  38 - 126 U/L Final   • Total Bilirubin 05/12/2021 0.4  0.2 - 1.3 mg/dL Final   • eGFR Non  Amer 05/12/2021 84  39 - 90 mL/min/1.73 Final   • Globulin 05/12/2021 2.3  2.3 - 3.5 gm/dL Final    • A/G Ratio 05/12/2021 1.7  1.1 - 1.8 g/dL Final   • BUN/Creatinine Ratio 05/12/2021 23.5  7.0 - 25.0 Final   • Anion Gap 05/12/2021 8.0  5.0 - 15.0 mmol/L Final   • Total Cholesterol 05/12/2021 190  150 - 200 mg/dL Final   • Triglycerides 05/12/2021 128  <=150 mg/dL Final   • HDL Cholesterol 05/12/2021 55  40 - 59 mg/dL Final   • LDL Cholesterol  05/12/2021 112* <=100 mg/dL Final   • VLDL Cholesterol 05/12/2021 23  5 - 40 mg/dL Final   • LDL/HDL Ratio 05/12/2021 1.99  0.00 - 3.22 Final   • T3, Free 05/12/2021 3.23  2.00 - 4.40 pg/mL Final   • T4, Total 05/12/2021 6.91  4.50 - 11.70 mcg/dL Final   • TSH 05/12/2021 2.830  0.270 - 4.200 uIU/mL Final   • Hemoglobin A1C 05/12/2021 5.13  4.80 - 5.60 % Final   • Vitamin B-12 05/12/2021 1,535* 211 - 946 pg/mL Final   • WBC 05/12/2021 5.50  3.40 - 10.80 10*3/mm3 Final   • RBC 05/12/2021 3.68* 3.77 - 5.28 10*6/mm3 Final   • Hemoglobin 05/12/2021 11.0* 12.0 - 15.9 g/dL Final   • Hematocrit 05/12/2021 34.4  34.0 - 46.6 % Final   • MCV 05/12/2021 93.5  79.0 - 97.0 fL Final   • MCH 05/12/2021 29.9  26.6 - 33.0 pg Final   • MCHC 05/12/2021 32.0  31.5 - 35.7 g/dL Final   • RDW 05/12/2021 14.4  12.3 - 15.4 % Final   • RDW-SD 05/12/2021 47.3  37.0 - 54.0 fl Final   • MPV 05/12/2021 11.6  6.0 - 12.0 fL Final   • Platelets 05/12/2021 275  140 - 450 10*3/mm3 Final   • Neutrophil % 05/12/2021 68.1  42.7 - 76.0 % Final   • Lymphocyte % 05/12/2021 20.4  19.6 - 45.3 % Final   • Monocyte % 05/12/2021 9.5  5.0 - 12.0 % Final   • Eosinophil % 05/12/2021 1.6  0.3 - 6.2 % Final   • Basophil % 05/12/2021 0.4  0.0 - 1.5 % Final   • Neutrophils, Absolute 05/12/2021 3.75  1.70 - 7.00 10*3/mm3 Final   • Lymphocytes, Absolute 05/12/2021 1.12  0.70 - 3.10 10*3/mm3 Final   • Monocytes, Absolute 05/12/2021 0.52  0.10 - 0.90 10*3/mm3 Final   • Eosinophils, Absolute 05/12/2021 0.09  0.00 - 0.40 10*3/mm3 Final   • Basophils, Absolute 05/12/2021 0.02  0.00 - 0.20 10*3/mm3 Final   ]    Assessment/Plan      1. Chest pain, unspecified type  Her chest pain has mixed features. It could be multifactorial including possibility of the anemia also related to flow from the heart abnormality that is heard with the murmur. This also could be secondary to her hypertension. I would like to get a echo first to make sure were not dealing with any significant stenosis and then we will plan on doing a Lexiscan Cardiolite.  - ECG 12 Lead    2. Murmur, cardiac  The murmur is a possibility that she has mitral regurgitation, and ASD or even possibly a PDA. Leaning more toward the first 2 and would like to assess his severity before we do noninvasive work-up of her chest pain. Obviously if she has something significant we would just go directly to an invasive work-up.  3. Hypertension  I am going to start her on Cardizem 180 mg daily for her hypertension and her fast heart rate.  - Adult Transthoracic Echo Complete W/ Cont if Necessary Per Protocol      Patient's Body mass index is 22.67 kg/m². indicating that she is within normal range (BMI 18.5-24.9). No BMI management plan needed..      Kimi Buck  reports that she has quit smoking. She has never used smokeless tobacco..         Return in about 3 months (around 12/24/2021).

## 2021-10-01 LAB
QT INTERVAL: 358 MS
QTC INTERVAL: 479 MS

## 2021-10-18 ENCOUNTER — OFFICE VISIT (OUTPATIENT)
Dept: FAMILY MEDICINE CLINIC | Facility: CLINIC | Age: 76
End: 2021-10-18

## 2021-10-18 VITALS — HEIGHT: 64 IN | WEIGHT: 130 LBS | BODY MASS INDEX: 22.2 KG/M2

## 2021-10-18 DIAGNOSIS — J06.9 ACUTE URI: Primary | ICD-10-CM

## 2021-10-18 DIAGNOSIS — E78.2 MIXED HYPERLIPIDEMIA: Primary | ICD-10-CM

## 2021-10-18 DIAGNOSIS — R05.9 COUGH: ICD-10-CM

## 2021-10-18 PROCEDURE — 99443 PR PHYS/QHP TELEPHONE EVALUATION 21-30 MIN: CPT | Performed by: NURSE PRACTITIONER

## 2021-10-18 RX ORDER — BROMPHENIRAMINE MALEATE, PSEUDOEPHEDRINE HYDROCHLORIDE, AND DEXTROMETHORPHAN HYDROBROMIDE 2; 30; 10 MG/5ML; MG/5ML; MG/5ML
5 SYRUP ORAL 4 TIMES DAILY PRN
Qty: 118 ML | Refills: 0 | Status: SHIPPED | OUTPATIENT
Start: 2021-10-18 | End: 2021-10-26

## 2021-10-18 NOTE — PROGRESS NOTES
"This was an audio and video enabled telemedicine encounter.    Chief Complaint  Cough    Subjective          The patient presents today via telephone for complaints of cough. She reports that she has a cough and she experiences a burning in her chest. The patient adds that she has been sick since 10/17/2021, however, she is coughing slightly. She adds that her cough is productive with a clear phlegm. She denies fever, shortness of breath, loss of taste or smell, or shortness of breath. The patient adds that she has been taking Z-quill. She states that her  is currently hospitalized with pneumonia however was tested at least 3 times and was neg for covid. She is fully vaccinated for covid 19.     Kimi Buck [facility]  History of Present Illness    Objective   Vital Signs:   Ht 161.3 cm (63.5\")   Wt 59 kg (130 lb)   BMI 22.67 kg/m²     Physical Exam  Constitutional:       Comments: Deferred, telephone visit.        Result Review :                 Assessment and Plan    Diagnoses and all orders for this visit:    1. Acute URI (Primary)    2. Cough    Other orders  -     brompheniramine-pseudoephedrine-DM 30-2-10 MG/5ML syrup; Take 5 mL by mouth 4 (Four) Times a Day As Needed for Congestion, Cough or Allergies.  Dispense: 118 mL; Refill: 0       I will treat her with Bromfed. She is advised to stop any over-the-counter medications as she is taking for allergies such as cough suppressants. I will advise her on potential side effects of medication and if her symptoms should persist or worsen, she is asked to return here for follow-up or call back if needed. Otherwise, I will see her back as scheduled for chronic conditions. All questions and concerns are addressed with understanding noted. The patient is in agreement to above plan. Considered swab for covid however she states she is fully vaccinated and her significant other tested negative " numerous times. Can reconsider if symptoms do not resolve or worsens.     I spent 22 minutes caring for Kimi on this date of service. This time includes time spent by me in the following activities:preparing for the visit, counseling and educating the patient/family/caregiver, ordering medications, tests, or procedures and documenting information in the medical record  Follow Up   Return if symptoms worsen or fail to improve, for Recheck, or sooner as needed.  Patient was given instructions and counseling regarding her condition or for health maintenance advice. Please see specific information pulled into the AVS if appropriate.     This visit has been rescheduled as a phone visit to comply with patient safety concerns in accordance with CDC recommendations. Total time of discussion was 22 minutes.    You have chosen to receive care through a telephone visit. Do you consent to use a telephone visit for your medical care today? yes    Transcribed from ambient dictation for TESFAYE Vargas by Whitney George.  10/18/21   15:58 CDT    I have personally performed the services described in this document as transcribed by the above individual, and it is both accurate and complete.  TESFAYE Vargas  10/19/2021  08:37 CDT

## 2021-10-25 ENCOUNTER — LAB (OUTPATIENT)
Dept: LAB | Facility: OTHER | Age: 76
End: 2021-10-25

## 2021-10-25 DIAGNOSIS — E78.2 MIXED HYPERLIPIDEMIA: ICD-10-CM

## 2021-10-25 DIAGNOSIS — R03.0 ELEVATED BLOOD PRESSURE READING: ICD-10-CM

## 2021-10-25 LAB
ALBUMIN SERPL-MCNC: 4.1 G/DL (ref 3.5–5)
ALBUMIN/GLOB SERPL: 1.5 G/DL (ref 1.1–1.8)
ALP SERPL-CCNC: 87 U/L (ref 38–126)
ALT SERPL W P-5'-P-CCNC: 18 U/L
ANION GAP SERPL CALCULATED.3IONS-SCNC: 7 MMOL/L (ref 5–15)
AST SERPL-CCNC: 25 U/L (ref 14–36)
BILIRUB SERPL-MCNC: 0.4 MG/DL (ref 0.2–1.3)
BUN SERPL-MCNC: 12 MG/DL (ref 7–23)
BUN/CREAT SERPL: 13.5 (ref 7–25)
CALCIUM SPEC-SCNC: 9.2 MG/DL (ref 8.4–10.2)
CHLORIDE SERPL-SCNC: 102 MMOL/L (ref 101–112)
CHOLEST SERPL-MCNC: 153 MG/DL (ref 150–200)
CO2 SERPL-SCNC: 27 MMOL/L (ref 22–30)
CREAT SERPL-MCNC: 0.89 MG/DL (ref 0.52–1.04)
DEPRECATED RDW RBC AUTO: 44 FL (ref 37–54)
EOSINOPHIL # BLD MANUAL: 0.08 10*3/MM3 (ref 0–0.4)
EOSINOPHIL NFR BLD MANUAL: 1 % (ref 0.3–6.2)
ERYTHROCYTE [DISTWIDTH] IN BLOOD BY AUTOMATED COUNT: 13.4 % (ref 12.3–15.4)
GFR SERPL CREATININE-BSD FRML MDRD: 62 ML/MIN/1.73 (ref 39–90)
GLOBULIN UR ELPH-MCNC: 2.7 GM/DL (ref 2.3–3.5)
GLUCOSE SERPL-MCNC: 142 MG/DL (ref 70–99)
HCT VFR BLD AUTO: 35.2 % (ref 34–46.6)
HDLC SERPL-MCNC: 38 MG/DL (ref 40–59)
HGB BLD-MCNC: 11.5 G/DL (ref 12–15.9)
LDLC SERPL CALC-MCNC: 62 MG/DL
LDLC/HDLC SERPL: 1.24 {RATIO} (ref 0–3.22)
LYMPHOCYTES # BLD MANUAL: 1.52 10*3/MM3 (ref 0.7–3.1)
LYMPHOCYTES NFR BLD MANUAL: 18 % (ref 19.6–45.3)
LYMPHOCYTES NFR BLD MANUAL: 7 % (ref 5–12)
MCH RBC QN AUTO: 30.6 PG (ref 26.6–33)
MCHC RBC AUTO-ENTMCNC: 32.7 G/DL (ref 31.5–35.7)
MCV RBC AUTO: 93.6 FL (ref 79–97)
MONOCYTES # BLD AUTO: 0.59 10*3/MM3 (ref 0.1–0.9)
NEUTROPHILS # BLD AUTO: 6.23 10*3/MM3 (ref 1.7–7)
NEUTROPHILS NFR BLD MANUAL: 74 % (ref 42.7–76)
PLATELET # BLD AUTO: 355 10*3/MM3 (ref 140–450)
PMV BLD AUTO: 10.9 FL (ref 6–12)
POTASSIUM SERPL-SCNC: 3.4 MMOL/L (ref 3.4–5)
PROT SERPL-MCNC: 6.8 G/DL (ref 6.3–8.6)
RBC # BLD AUTO: 3.76 10*6/MM3 (ref 3.77–5.28)
RBC MORPH BLD: NORMAL
SMALL PLATELETS BLD QL SMEAR: ADEQUATE
SODIUM SERPL-SCNC: 136 MMOL/L (ref 137–145)
TRIGL SERPL-MCNC: 340 MG/DL
VLDLC SERPL-MCNC: 53 MG/DL (ref 5–40)
WBC # BLD AUTO: 8.42 10*3/MM3 (ref 3.4–10.8)
WBC MORPH BLD: NORMAL

## 2021-10-25 PROCEDURE — 80061 LIPID PANEL: CPT | Performed by: NURSE PRACTITIONER

## 2021-10-25 PROCEDURE — 80053 COMPREHEN METABOLIC PANEL: CPT | Performed by: NURSE PRACTITIONER

## 2021-10-25 PROCEDURE — 36415 COLL VENOUS BLD VENIPUNCTURE: CPT | Performed by: NURSE PRACTITIONER

## 2021-10-25 PROCEDURE — 85025 COMPLETE CBC W/AUTO DIFF WBC: CPT | Performed by: NURSE PRACTITIONER

## 2021-10-26 ENCOUNTER — OFFICE VISIT (OUTPATIENT)
Dept: FAMILY MEDICINE CLINIC | Facility: CLINIC | Age: 76
End: 2021-10-26

## 2021-10-26 VITALS
DIASTOLIC BLOOD PRESSURE: 88 MMHG | HEIGHT: 64 IN | HEART RATE: 99 BPM | OXYGEN SATURATION: 97 % | WEIGHT: 130 LBS | SYSTOLIC BLOOD PRESSURE: 140 MMHG | BODY MASS INDEX: 22.2 KG/M2

## 2021-10-26 DIAGNOSIS — J40 BRONCHITIS: Primary | ICD-10-CM

## 2021-10-26 PROCEDURE — 99214 OFFICE O/P EST MOD 30 MIN: CPT | Performed by: NURSE PRACTITIONER

## 2021-10-26 PROCEDURE — 96372 THER/PROPH/DIAG INJ SC/IM: CPT | Performed by: NURSE PRACTITIONER

## 2021-10-26 RX ORDER — METHYLPREDNISOLONE 4 MG/1
TABLET ORAL
Qty: 1 EACH | Refills: 0 | Status: SHIPPED | OUTPATIENT
Start: 2021-10-26 | End: 2023-03-22

## 2021-10-26 RX ORDER — METHYLPREDNISOLONE ACETATE 80 MG/ML
60 INJECTION, SUSPENSION INTRA-ARTICULAR; INTRALESIONAL; INTRAMUSCULAR; SOFT TISSUE ONCE
Status: COMPLETED | OUTPATIENT
Start: 2021-10-26 | End: 2021-10-26

## 2021-10-26 RX ORDER — ALBUTEROL SULFATE 90 UG/1
2 AEROSOL, METERED RESPIRATORY (INHALATION) EVERY 4 HOURS PRN
Qty: 6.7 G | Refills: 0 | Status: SHIPPED | OUTPATIENT
Start: 2021-10-26

## 2021-10-26 RX ORDER — AZITHROMYCIN 250 MG/1
TABLET, FILM COATED ORAL
Qty: 6 TABLET | Refills: 0 | Status: SHIPPED | OUTPATIENT
Start: 2021-10-26 | End: 2023-03-22

## 2021-10-26 RX ADMIN — METHYLPREDNISOLONE ACETATE 60 MG: 80 INJECTION, SUSPENSION INTRA-ARTICULAR; INTRALESIONAL; INTRAMUSCULAR; SOFT TISSUE at 11:48

## 2021-10-27 NOTE — PROGRESS NOTES
"Chief Complaint  Cough (dry cough, deep cough, coughing clear mucus, x1 week)    Subjective          Kimi Buck presents to [facility] [place]  History of Present Illness  Pt here today with complaints of cough, wheezing, she was treated with bromfed a few days ago, symptoms not improving, declined covid testing due to her spouse having same complaints, having been covid tested several times and was neg. She denies fever. Denies loss of taste, smell, headache, fatigue, shortness of breath.   Objective   Vital Signs:   /88   Pulse 99   Ht 161.3 cm (63.5\")   Wt 59 kg (130 lb)   SpO2 97%   BMI 22.67 kg/m²     Physical Exam  Vitals and nursing note reviewed.   Constitutional:       General: She is not in acute distress.     Appearance: Normal appearance. She is normal weight. She is not ill-appearing, toxic-appearing or diaphoretic.   HENT:      Head: Normocephalic and atraumatic.      Right Ear: Tympanic membrane, ear canal and external ear normal. There is no impacted cerumen.      Left Ear: Tympanic membrane, ear canal and external ear normal. There is no impacted cerumen.   Cardiovascular:      Rate and Rhythm: Normal rate.      Heart sounds: Murmur heard.   No friction rub. No gallop.    Pulmonary:      Effort: Pulmonary effort is normal. No respiratory distress.      Breath sounds: No stridor. Wheezing present. No rhonchi or rales.      Comments: Pulse ox on RA 97%  Lymphadenopathy:      Cervical: No cervical adenopathy.   Skin:     General: Skin is warm and dry.      Coloration: Skin is not jaundiced or pale.      Findings: No bruising, erythema, lesion or rash.   Neurological:      Mental Status: She is alert and oriented to person, place, and time.   Psychiatric:         Mood and Affect: Mood normal.         Behavior: Behavior normal.         Thought Content: Thought content normal.         Judgment: Judgment normal.        Result Review :{Labs  " Result Review  Imaging  Med Tab  Media  Procedures :23}     Common labs    Common Labsle 5/12/21 5/12/21 5/12/21 5/12/21 10/25/21 10/25/21 10/25/21    0916 0916 0916 0916 1424 1424 1424   Glucose 172 (A)    142 (A)     BUN 16    12     Creatinine 0.68    0.89     eGFR Non African Am 84    62     Sodium 135 (A)    136 (A)     Potassium 3.9    3.4     Chloride 100 (A)    102     Calcium 9.2    9.2     Albumin 3.90    4.10     Total Bilirubin 0.4    0.4     Alkaline Phosphatase 65    87     AST (SGOT) 41 (A)    25     ALT (SGPT) 27    18     WBC   5.50    8.42   Hemoglobin   11.0 (A)    11.5 (A)   Hematocrit   34.4    35.2   Platelets   275    355   Total Cholesterol  190    153    Triglycerides  128    340 (A)    HDL Cholesterol  55    38 (A)    LDL Cholesterol   112 (A)    62    Hemoglobin A1C    5.13      (A) Abnormal value            CMP    CMP 5/12/21 10/25/21   Glucose 172 (A) 142 (A)   BUN 16 12   Creatinine 0.68 0.89   eGFR Non African Am 84 62   Sodium 135 (A) 136 (A)   Potassium 3.9 3.4   Chloride 100 (A) 102   Calcium 9.2 9.2   Albumin 3.90 4.10   Total Bilirubin 0.4 0.4   Alkaline Phosphatase 65 87   AST (SGOT) 41 (A) 25   ALT (SGPT) 27 18   (A) Abnormal value            CBC    CBC 5/12/21 10/25/21   WBC 5.50 8.42   RBC 3.68 (A) 3.76 (A)   Hemoglobin 11.0 (A) 11.5 (A)   Hematocrit 34.4 35.2   MCV 93.5 93.6   MCH 29.9 30.6   MCHC 32.0 32.7   RDW 14.4 13.4   Platelets 275 355   (A) Abnormal value            CBC w/diff    CBC w/Diff 5/12/21 10/25/21   WBC 5.50 8.42   RBC 3.68 (A) 3.76 (A)   Hemoglobin 11.0 (A) 11.5 (A)   Hematocrit 34.4 35.2   MCV 93.5 93.6   MCH 29.9 30.6   MCHC 32.0 32.7   RDW 14.4 13.4   Platelets 275 355   Neutrophil Rel % 68.1    Lymphocyte Rel % 20.4    Monocyte Rel % 9.5    Eosinophil Rel % 1.6    Basophil Rel % 0.4    (A) Abnormal value            Lipid Panel    Lipid Panel 5/12/21 10/25/21   Total Cholesterol 190 153   Triglycerides 128 340 (A)   HDL Cholesterol 55 38 (A)   VLDL  Cholesterol 23 53 (A)   LDL Cholesterol  112 (A) 62   LDL/HDL Ratio 1.99 1.24   (A) Abnormal value            TSH    TSH 5/12/21   TSH 2.830                     Assessment and Plan    Diagnoses and all orders for this visit:    1. Bronchitis (Primary)  -     methylPREDNISolone acetate (DEPO-medrol) injection 60 mg    Other orders  -     azithromycin (Zithromax Z-Adonis) 250 MG tablet; Take 2 po now and 1 po d2-5  Dispense: 6 tablet; Refill: 0  -     methylPREDNISolone (MEDROL) 4 MG dose pack; Take as directed on package instructions.  Dispense: 1 each; Refill: 0  -     albuterol sulfate  (90 Base) MCG/ACT inhaler; Inhale 2 puffs Every 4 (Four) Hours As Needed for Wheezing.  Dispense: 6.7 g; Refill: 0  -     metFORMIN (Glucophage) 500 MG tablet; Take 1 tablet by mouth 2 (Two) Times a Day With Meals.  Dispense: 60 tablet; Refill: 2    she is prescribed zithromax, medrol, albuterol inhaler as above, if symptoms should persist or worsen she is asked to come back here, go to urgent care or ER for emergent condition, she declines covid testing  Labs are discussed with her, started on metformin as above, advised on potential side effects of medicine  Encouraged her to follow diabetic diet , exercise and repeat labs in 3 months including a1c, cbc, cmp  All questions and concerns addressed with understanding noted  She is aware and is in agreement to this plan.    I spent 33 minutes caring for Kimi on this date of service. This time includes time spent by me in the following activities:preparing for the visit, performing a medically appropriate examination and/or evaluation , counseling and educating the patient/family/caregiver, ordering medications, tests, or procedures and documenting information in the medical record  Follow Up   Return if symptoms worsen or fail to improve, for Recheck, or sooner as needed.  Patient was given instructions and counseling regarding her condition or for health maintenance advice.  Please see specific information pulled into the AVS if appropriate.

## 2021-12-02 LAB
BH CV ECHO MEAS - ACS: 1.4 CM
BH CV ECHO MEAS - AO ISTHMUS: 2.2 CM
BH CV ECHO MEAS - AO MAX PG (FULL): 1.8 MMHG
BH CV ECHO MEAS - AO MAX PG: 14.6 MMHG
BH CV ECHO MEAS - AO MEAN PG (FULL): 2 MMHG
BH CV ECHO MEAS - AO MEAN PG: 9 MMHG
BH CV ECHO MEAS - AO ROOT AREA (BSA CORRECTED): 1.1
BH CV ECHO MEAS - AO ROOT AREA: 2.5 CM^2
BH CV ECHO MEAS - AO ROOT DIAM: 1.8 CM
BH CV ECHO MEAS - AO V2 MAX: 191 CM/SEC
BH CV ECHO MEAS - AO V2 MEAN: 143 CM/SEC
BH CV ECHO MEAS - AO V2 VTI: 37 CM
BH CV ECHO MEAS - ASC AORTA: 2.2 CM
BH CV ECHO MEAS - AVA(I,A): 2.6 CM^2
BH CV ECHO MEAS - AVA(I,D): 2.6 CM^2
BH CV ECHO MEAS - AVA(V,A): 2.7 CM^2
BH CV ECHO MEAS - AVA(V,D): 2.7 CM^2
BH CV ECHO MEAS - BSA(HAYCOCK): 1.6 M^2
BH CV ECHO MEAS - BSA: 1.6 M^2
BH CV ECHO MEAS - BZI_BMI: 23 KILOGRAMS/M^2
BH CV ECHO MEAS - BZI_METRIC_HEIGHT: 160 CM
BH CV ECHO MEAS - BZI_METRIC_WEIGHT: 59 KG
BH CV ECHO MEAS - EDV(CUBED): 50.7 ML
BH CV ECHO MEAS - EDV(MOD-SP2): 51.2 ML
BH CV ECHO MEAS - EDV(MOD-SP4): 56.9 ML
BH CV ECHO MEAS - EDV(TEICH): 58.1 ML
BH CV ECHO MEAS - EF(CUBED): 81.2 %
BH CV ECHO MEAS - EF(MOD-SP2): 63.9 %
BH CV ECHO MEAS - EF(MOD-SP4): 65 %
BH CV ECHO MEAS - EF(TEICH): 74.6 %
BH CV ECHO MEAS - ESV(CUBED): 9.5 ML
BH CV ECHO MEAS - ESV(MOD-SP2): 18.5 ML
BH CV ECHO MEAS - ESV(MOD-SP4): 19.9 ML
BH CV ECHO MEAS - ESV(TEICH): 14.8 ML
BH CV ECHO MEAS - FS: 42.7 %
BH CV ECHO MEAS - IVS/LVPW: 0.92
BH CV ECHO MEAS - IVSD: 0.81 CM
BH CV ECHO MEAS - LA DIMENSION: 3.4 CM
BH CV ECHO MEAS - LA/AO: 1.9
BH CV ECHO MEAS - LV DIASTOLIC VOL/BSA (35-75): 35.3 ML/M^2
BH CV ECHO MEAS - LV MASS(C)D: 88.7 GRAMS
BH CV ECHO MEAS - LV MASS(C)DI: 55.1 GRAMS/M^2
BH CV ECHO MEAS - LV MAX PG: 12.8 MMHG
BH CV ECHO MEAS - LV MEAN PG: 7 MMHG
BH CV ECHO MEAS - LV SYSTOLIC VOL/BSA (12-30): 12.4 ML/M^2
BH CV ECHO MEAS - LV V1 MAX: 179 CM/SEC
BH CV ECHO MEAS - LV V1 MEAN: 125 CM/SEC
BH CV ECHO MEAS - LV V1 VTI: 33.5 CM
BH CV ECHO MEAS - LVIDD: 3.7 CM
BH CV ECHO MEAS - LVIDS: 2.1 CM
BH CV ECHO MEAS - LVLD AP2: 7.1 CM
BH CV ECHO MEAS - LVLD AP4: 7.2 CM
BH CV ECHO MEAS - LVLS AP2: 6.6 CM
BH CV ECHO MEAS - LVLS AP4: 6.5 CM
BH CV ECHO MEAS - LVOT AREA (M): 2.8 CM^2
BH CV ECHO MEAS - LVOT AREA: 2.8 CM^2
BH CV ECHO MEAS - LVOT DIAM: 1.9 CM
BH CV ECHO MEAS - LVPWD: 0.88 CM
BH CV ECHO MEAS - MR MAX PG: 151.3 MMHG
BH CV ECHO MEAS - MR MAX VEL: 615 CM/SEC
BH CV ECHO MEAS - MV DEC SLOPE: 975 CM/SEC^2
BH CV ECHO MEAS - MV E MAX VEL: 142 CM/SEC
BH CV ECHO MEAS - MV MAX PG: 10 MMHG
BH CV ECHO MEAS - MV MEAN PG: 3 MMHG
BH CV ECHO MEAS - MV P1/2T MAX VEL: 159 CM/SEC
BH CV ECHO MEAS - MV P1/2T: 47.8 MSEC
BH CV ECHO MEAS - MV V2 MAX: 158 CM/SEC
BH CV ECHO MEAS - MV V2 MEAN: 81.3 CM/SEC
BH CV ECHO MEAS - MV V2 VTI: 22.5 CM
BH CV ECHO MEAS - MVA P1/2T LCG: 1.4 CM^2
BH CV ECHO MEAS - MVA(P1/2T): 4.6 CM^2
BH CV ECHO MEAS - MVA(VTI): 4.2 CM^2
BH CV ECHO MEAS - PA MAX PG (FULL): 4.5 MMHG
BH CV ECHO MEAS - PA MAX PG: 7.4 MMHG
BH CV ECHO MEAS - PA MEAN PG (FULL): 2 MMHG
BH CV ECHO MEAS - PA MEAN PG: 3 MMHG
BH CV ECHO MEAS - PA V2 MAX: 136 CM/SEC
BH CV ECHO MEAS - PA V2 MEAN: 86.5 CM/SEC
BH CV ECHO MEAS - PA V2 VTI: 27 CM
BH CV ECHO MEAS - PVA(I,A): 3.4 CM^2
BH CV ECHO MEAS - PVA(I,D): 3.4 CM^2
BH CV ECHO MEAS - PVA(V,A): 4.5 CM^2
BH CV ECHO MEAS - PVA(V,D): 4.5 CM^2
BH CV ECHO MEAS - QP/QS: 0.97
BH CV ECHO MEAS - RV MAX PG: 2.9 MMHG
BH CV ECHO MEAS - RV MEAN PG: 1 MMHG
BH CV ECHO MEAS - RV V1 MAX: 85.7 CM/SEC
BH CV ECHO MEAS - RV V1 MEAN: 46 CM/SEC
BH CV ECHO MEAS - RV V1 VTI: 13.1 CM
BH CV ECHO MEAS - RVOT AREA: 7.1 CM^2
BH CV ECHO MEAS - RVOT DIAM: 3 CM
BH CV ECHO MEAS - SI(AO): 58.5 ML/M^2
BH CV ECHO MEAS - SI(CUBED): 25.5 ML/M^2
BH CV ECHO MEAS - SI(LVOT): 59 ML/M^2
BH CV ECHO MEAS - SI(MOD-SP2): 20.3 ML/M^2
BH CV ECHO MEAS - SI(MOD-SP4): 23 ML/M^2
BH CV ECHO MEAS - SI(TEICH): 26.9 ML/M^2
BH CV ECHO MEAS - SV(AO): 94.2 ML
BH CV ECHO MEAS - SV(CUBED): 41.1 ML
BH CV ECHO MEAS - SV(LVOT): 95 ML
BH CV ECHO MEAS - SV(MOD-SP2): 32.7 ML
BH CV ECHO MEAS - SV(MOD-SP4): 37 ML
BH CV ECHO MEAS - SV(RVOT): 92.6 ML
BH CV ECHO MEAS - SV(TEICH): 43.4 ML
MAXIMAL PREDICTED HEART RATE: 145 BPM
STRESS TARGET HR: 123 BPM

## 2021-12-03 ENCOUNTER — TELEPHONE (OUTPATIENT)
Dept: CARDIOLOGY | Facility: CLINIC | Age: 76
End: 2021-12-03

## 2021-12-03 DIAGNOSIS — R07.9 CHEST PAIN, UNSPECIFIED TYPE: Primary | ICD-10-CM

## 2021-12-03 NOTE — TELEPHONE ENCOUNTER
Contacted PT per Dr. Hercules about results. Results are as follows; Echo showed no sig abnormality.  Due to delay in getting echo, we need to get nuclear and see her afterwards , so she can miss Tuesdays appt.  PT was unavailable and left VM.  Scheduled stress test for 12/20/21 @7:30am      ----- Message from Pritesh Hercules MD sent at 12/3/2021  9:15 AM CST -----  Echo showed no sig abnormality.    Due to delay in getting echo, we need to get nuclear and see her afterwards , so she can miss Tuesdays appt.

## 2021-12-20 ENCOUNTER — APPOINTMENT (OUTPATIENT)
Dept: NUCLEAR MEDICINE | Facility: HOSPITAL | Age: 76
End: 2021-12-20

## 2021-12-20 ENCOUNTER — APPOINTMENT (OUTPATIENT)
Dept: CARDIOLOGY | Facility: HOSPITAL | Age: 76
End: 2021-12-20

## 2021-12-29 ENCOUNTER — APPOINTMENT (OUTPATIENT)
Dept: NUCLEAR MEDICINE | Facility: HOSPITAL | Age: 76
End: 2021-12-29

## 2021-12-29 ENCOUNTER — APPOINTMENT (OUTPATIENT)
Dept: CARDIOLOGY | Facility: HOSPITAL | Age: 76
End: 2021-12-29

## 2022-01-27 ENCOUNTER — APPOINTMENT (OUTPATIENT)
Dept: NUCLEAR MEDICINE | Facility: HOSPITAL | Age: 77
End: 2022-01-27

## 2022-01-27 ENCOUNTER — APPOINTMENT (OUTPATIENT)
Dept: CARDIOLOGY | Facility: HOSPITAL | Age: 77
End: 2022-01-27

## 2022-02-03 ENCOUNTER — APPOINTMENT (OUTPATIENT)
Dept: CARDIOLOGY | Facility: HOSPITAL | Age: 77
End: 2022-02-03

## 2022-02-03 ENCOUNTER — APPOINTMENT (OUTPATIENT)
Dept: NUCLEAR MEDICINE | Facility: HOSPITAL | Age: 77
End: 2022-02-03

## 2022-02-22 ENCOUNTER — HOSPITAL ENCOUNTER (OUTPATIENT)
Dept: NUCLEAR MEDICINE | Facility: HOSPITAL | Age: 77
Discharge: HOME | End: 2022-02-22

## 2022-02-22 ENCOUNTER — HOSPITAL ENCOUNTER (OUTPATIENT)
Dept: CARDIOLOGY | Facility: HOSPITAL | Age: 77
Discharge: HOME | End: 2022-02-22

## 2022-02-22 DIAGNOSIS — R07.9 CHEST PAIN, UNSPECIFIED TYPE: ICD-10-CM

## 2022-02-22 LAB
BH CV REST NUCLEAR ISOTOPE DOSE: 10.5 MCI
BH CV STRESS BP STAGE 1: NORMAL
BH CV STRESS COMMENTS STAGE 1: NORMAL
BH CV STRESS DOSE REGADENOSON STAGE 1: 0.4
BH CV STRESS DURATION MIN STAGE 1: 0
BH CV STRESS DURATION SEC STAGE 1: 10
BH CV STRESS HR STAGE 1: 108
BH CV STRESS NUCLEAR ISOTOPE DOSE: 37.9 MCI
BH CV STRESS PROTOCOL 1: NORMAL
BH CV STRESS RECOVERY BP: NORMAL MMHG
BH CV STRESS RECOVERY HR: 125 BPM
BH CV STRESS STAGE 1: 1
MAXIMAL PREDICTED HEART RATE: 145 BPM
PERCENT MAX PREDICTED HR: 87.59 %
STRESS BASELINE BP: NORMAL MMHG
STRESS BASELINE HR: 99 BPM
STRESS PERCENT HR: 103 %
STRESS POST ESTIMATED WORKLOAD: 1 METS
STRESS POST PEAK BP: NORMAL MMHG
STRESS POST PEAK HR: 127 BPM
STRESS TARGET HR: 123 BPM

## 2022-02-22 PROCEDURE — A9500 TC99M SESTAMIBI: HCPCS | Performed by: INTERNAL MEDICINE

## 2022-02-22 PROCEDURE — 93016 CV STRESS TEST SUPVJ ONLY: CPT | Performed by: INTERNAL MEDICINE

## 2022-02-22 PROCEDURE — 78452 HT MUSCLE IMAGE SPECT MULT: CPT

## 2022-02-22 PROCEDURE — 78452 HT MUSCLE IMAGE SPECT MULT: CPT | Performed by: INTERNAL MEDICINE

## 2022-02-22 PROCEDURE — 93017 CV STRESS TEST TRACING ONLY: CPT

## 2022-02-22 PROCEDURE — 93018 CV STRESS TEST I&R ONLY: CPT | Performed by: INTERNAL MEDICINE

## 2022-02-22 PROCEDURE — 0 TECHNETIUM SESTAMIBI: Performed by: INTERNAL MEDICINE

## 2022-02-22 PROCEDURE — 25010000002 REGADENOSON 0.4 MG/5ML SOLUTION: Performed by: INTERNAL MEDICINE

## 2022-02-22 RX ORDER — SODIUM CHLORIDE 0.9 % (FLUSH) 0.9 %
10 SYRINGE (ML) INJECTION ONCE
Status: COMPLETED | OUTPATIENT
Start: 2022-02-22 | End: 2022-02-22

## 2022-02-22 RX ADMIN — Medication 10 ML: at 10:51

## 2022-02-22 RX ADMIN — REGADENOSON 0.4 MG: 0.08 INJECTION, SOLUTION INTRAVENOUS at 10:51

## 2022-02-22 RX ADMIN — TECHNETIUM TC 99M SESTAMIBI 1 DOSE: 1 INJECTION INTRAVENOUS at 10:52

## 2022-02-22 RX ADMIN — TECHNETIUM TC 99M SESTAMIBI 1 DOSE: 1 INJECTION INTRAVENOUS at 08:51

## 2022-02-23 ENCOUNTER — APPOINTMENT (OUTPATIENT)
Dept: NUCLEAR MEDICINE | Facility: HOSPITAL | Age: 77
End: 2022-02-23

## 2022-02-23 ENCOUNTER — APPOINTMENT (OUTPATIENT)
Dept: CARDIOLOGY | Facility: HOSPITAL | Age: 77
End: 2022-02-23

## 2022-02-25 ENCOUNTER — TELEPHONE (OUTPATIENT)
Dept: CARDIOLOGY | Facility: CLINIC | Age: 77
End: 2022-02-25

## 2022-02-25 NOTE — TELEPHONE ENCOUNTER
Contacted PT per Dr. Hercules about results. Results are as follows; No ischemia.  PT was unavailable and left detailed vm.      ----- Message from Pritesh Hercules MD sent at 2/24/2022  1:08 PM CST -----  Regarding: Stress test  No ischemia  ----- Message -----  From: Pritesh Hercules MD  Sent: 2/22/2022   4:29 PM CST  To: Pritesh Hercules MD

## 2022-03-01 ENCOUNTER — OFFICE VISIT (OUTPATIENT)
Dept: CARDIOLOGY | Facility: CLINIC | Age: 77
End: 2022-03-01

## 2022-03-01 VITALS
OXYGEN SATURATION: 99 % | DIASTOLIC BLOOD PRESSURE: 80 MMHG | TEMPERATURE: 95 F | WEIGHT: 131 LBS | HEIGHT: 64 IN | HEART RATE: 116 BPM | BODY MASS INDEX: 22.36 KG/M2 | SYSTOLIC BLOOD PRESSURE: 170 MMHG

## 2022-03-01 DIAGNOSIS — I34.0 MITRAL VALVE INSUFFICIENCY, UNSPECIFIED ETIOLOGY: ICD-10-CM

## 2022-03-01 DIAGNOSIS — R01.1 MURMUR, CARDIAC: Primary | ICD-10-CM

## 2022-03-01 DIAGNOSIS — I10 BENIGN ESSENTIAL HTN: ICD-10-CM

## 2022-03-01 DIAGNOSIS — R07.9 CHEST PAIN, UNSPECIFIED TYPE: ICD-10-CM

## 2022-03-01 PROCEDURE — 99214 OFFICE O/P EST MOD 30 MIN: CPT | Performed by: INTERNAL MEDICINE

## 2022-03-01 RX ORDER — METOPROLOL SUCCINATE 50 MG/1
50 TABLET, EXTENDED RELEASE ORAL DAILY
Qty: 30 TABLET | Refills: 11 | Status: SHIPPED | OUTPATIENT
Start: 2022-03-01 | End: 2023-03-22

## 2022-03-01 NOTE — PROGRESS NOTES
Kimi Buck  76 y.o. female    03/01/2022  Chief Complaint   Patient presents with   • Chest pain, unspecified type     Chief Complaint       History of Present Illness    Patient here for follow-up hypertension, mitral regurgitation, chest pain        SUBJECTIVE  Patient has no further chest pain.  We did a noninvasive work-up and this was negative.  He has no shortness of air.  She cannot tolerate the diltiazem and is not taking it.  Her blood pressure is elevated.  She has no headaches or anything like that from this.  I went over both her stress test and then also her echo which showed mitral regurgitation.  Allergies   Allergen Reactions   • Penicillins Itching         Past Medical History:   Diagnosis Date   • Heart murmur    • Urinary tract infection    • Visual impairment          Past Surgical History:   Procedure Laterality Date   • COLONOSCOPY     • ENDOSCOPY N/A 12/12/2018    Procedure: ESOPHAGOGASTRODUODENOSCOPY possible dilation urgent;  Surgeon: Jam Mccracken MD;  Location: [facility] ENDOSCOPY;  Service: Gastroenterology   • EYE SURGERY     • VARICOSE VEIN SURGERY           Family History   Problem Relation Age of Onset   • Cancer Mother    • COPD Mother    • Vision loss Mother    • Vision loss Father          Social History     Socioeconomic History   • Marital status:    Tobacco Use   • Smoking status: Former Smoker   • Smokeless tobacco: Never Used   Substance and Sexual Activity   • Alcohol use: No   • Drug use: No   • Sexual activity: Yes         Prior to Admission medications    Medication Sig Start Date End Date Taking? Authorizing Provider   albuterol sulfate  (90 Base) MCG/ACT inhaler Inhale 2 puffs Every 4 (Four) Hours As Needed for Wheezing. 10/26/21  Yes Bhakti Tran APRN   aspirin 81 MG chewable tablet Chew 81 mg Daily.   Yes Provider, MD Alena   atorvastatin (LIPITOR) 20 MG tablet TAKE 1 TABLET BY MOUTH EVERY EVENING. [MUST HAVE APPOINTMENT FOR ADDITIONAL  "REFILLS] 9/13/21  Yes Bhakti Tran APRN   azithromycin (Zithromax Z-Adonis) 250 MG tablet Take 2 po now and 1 po d2-5 10/26/21  Yes Bhakti Tran APRN   ferrous sulfate 325 (65 FE) MG tablet Take 325 mg by mouth.   Yes Provider, MD Alena   metFORMIN (Glucophage) 500 MG tablet Take 1 tablet by mouth 2 (Two) Times a Day With Meals. 10/26/21  Yes Bhakti Tran APRN   methylPREDNISolone (MEDROL) 4 MG dose pack Take as directed on package instructions. 10/26/21  Yes Bhakti Tran APRN   dilTIAZem CD (CARDIZEM CD) 180 MG 24 hr capsule Take 1 capsule by mouth Daily. 9/24/21   Pritesh Hercules MD   metoprolol succinate XL (TOPROL-XL) 50 MG 24 hr tablet Take 1 tablet by mouth Daily. 3/1/22   Pritesh Hercules MD         OBJECTIVE    /80 (BP Location: Left arm, Patient Position: Sitting, Cuff Size: Adult)   Pulse 116   Temp 95 °F (35 °C)   Ht 161.3 cm (63.5\")   Wt 59.4 kg (131 lb)   SpO2 99%   BMI 22.84 kg/m²         Review of Systems     Constitutional:  Denies recent weight loss, weight gain, fever or chills, no change in exercise tolerance     HENT:  Denies any hearing loss, epistaxis, hoarseness, or difficulty speaking.     Eyes: Wears eyeglasses or contact lenses     Respiratory:  Denies dyspnea with exertion,no cough, wheezing, or hemoptysis.     Cardiovascular: Negative for palpations, chest pain, orthopnea, PND, peripheral edema, syncope, or claudication.     Gastrointestinal:  Denies change in bowel habits, dyspepsia, ulcer disease, hematochezia, or melena.     Endocrine: Negative for cold intolerance, heat intolerance, polydipsia, polyphagia and polyuria. Denies any history of weight change, heat/cold intolerance, polydipsia, polyuria     Genitourinary: Negative.      Musculoskeletal: Denies any history of arthritic symptoms or back problems     Skin:  Denies any change in hair or nails, rashes, or skin lesions.     Allergic/Immunologic: Negative.  Negative for environmental allergies, " food allergies and immunocompromised state.     Neurological:  Denies any history of recurrent headaches, strokes, TIA, or seizure disorder.     Hematological: Denies any food allergies, seasonal allergies, bleeding disorders, or lymphadenopathy.     Psychiatric/Behavioral: Denies any history of depression, substance abuse, or change in cognitive function.         Physical Exam     Constitutional: Cooperative, alert and oriented, well-developed, well-nourished, in no acute distress.     HENT:   Head: Normocephalic, normal hair patterns, no masses or tenderness.  Ears, Nose, and Throat: No gross abnormalities. No pallor or cyanosis. Dentition good.   Eyes: EOMS intact, PERRL, conjunctivae and lids unremarkable. Fundoscopic exam and visual fields not performed.   Neck: No palpable masses or adenopathy, no thyromegaly, no JVD, carotid pulses are full and equal bilaterally and without  Bruits.     Cardiovascular: Regular rhythm, S1 and S2 normal, no S3 or S4. Apical impulse not displaced. No murmurs, gallops, or rubs detected.     Pulmonary/Chest: Chest: normal symmetry, no tenderness to palpation, normal respiratory excursion, no intercostal retraction, no use of accessory muscles.            Pulmonary: Normal breath sounds. No rales or ronchi.    Abdominal: Abdomen soft, bowel sounds normoactive, no masses, no hepatosplenomegaly, non-tender, no bruits.     Musculoskeletal: No deformities, clubbing, cyanosis, erythema, or edema observed. There are no spinal abnormalities noted. Normal muscle strength and tone. Pulses full and equal in all extremities, no bruits auscultated.     Neurological: No gross motor or sensory deficits noted, affect appropriate, oriented to time, person, place.     Skin: Warm and dry to the touch, no apparent skin lesions or masses noted.     Psychiatric: She has a normal mood and affect. Her behavior is normal. Judgment and thought content normal.         Procedures      Lab Results   Component  Value Date    WBC 8.42 10/25/2021    HGB 11.5 (L) 10/25/2021    HCT 35.2 10/25/2021    MCV 93.6 10/25/2021     10/25/2021     Lab Results   Component Value Date    GLUCOSE 142 (H) 10/25/2021    BUN 12 10/25/2021    CREATININE 0.89 10/25/2021    EGFRIFNONA 62 10/25/2021    BCR 13.5 10/25/2021    CO2 27.0 10/25/2021    CALCIUM 9.2 10/25/2021    ALBUMIN 4.10 10/25/2021    AST 25 10/25/2021    ALT 18 10/25/2021     Lab Results   Component Value Date    CHOL 153 10/25/2021    CHOL 190 05/12/2021    CHOL 160 09/25/2020     Lab Results   Component Value Date    TRIG 340 (H) 10/25/2021    TRIG 128 05/12/2021    TRIG 116 09/25/2020     Lab Results   Component Value Date    HDL 38 (L) 10/25/2021    HDL 55 05/12/2021    HDL 54 09/25/2020     No components found for: LDLCALC  Lab Results   Component Value Date    LDL 62 10/25/2021     (H) 05/12/2021    LDL 83 09/25/2020     No results found for: HDLLDLRATIO  No components found for: CHOLHDL  Lab Results   Component Value Date    HGBA1C 5.13 05/12/2021     Lab Results   Component Value Date    TSH 2.830 05/12/2021    N1OLIAN 6.91 05/12/2021           ASSESSMENT AND PLAN      Diagnoses and all orders for this visit:    1. Murmur, cardiac (Primary)    2. Mitral valve insufficiency, unspecified etiology    3. Benign essential HTN    4. Chest pain, unspecified type  I went over the patient's echo findings with her.  She has mild to moderate mitral regurgitation.  I told her we should plan on doing a echo in the next 1 to 2 years as long as she remains asymptomatic.    Her blood pressure is elevated and she no longer taking the diltiazem.  I am going to start her on metoprolol succinate 50 mg daily.  She will take 25 mg or half a tablet daily for 2 weeks and then 50 mg daily after that.  She will come back for blood pressure check in 4 weeks.    She has no further chest pain and her nuclear showed no significant ischemia.  Other orders  -     metoprolol succinate XL  (TOPROL-XL) 50 MG 24 hr tablet; Take 1 tablet by mouth Daily.  Dispense: 30 tablet; Refill: 11        Patient's Body mass index is 22.84 kg/m². indicating that she is within normal range (BMI 18.5-24.9). No BMI management plan needed..                Pritesh Hercules MD  3/1/2022  10:06 CST

## 2022-03-29 ENCOUNTER — OFFICE VISIT (OUTPATIENT)
Dept: CARDIOLOGY | Facility: CLINIC | Age: 77
End: 2022-03-29

## 2022-03-29 VITALS
TEMPERATURE: 98.6 F | OXYGEN SATURATION: 98 % | WEIGHT: 136.4 LBS | HEART RATE: 78 BPM | BODY MASS INDEX: 23.29 KG/M2 | HEIGHT: 64 IN | DIASTOLIC BLOOD PRESSURE: 80 MMHG | SYSTOLIC BLOOD PRESSURE: 132 MMHG

## 2022-03-29 DIAGNOSIS — I10 BENIGN ESSENTIAL HTN: ICD-10-CM

## 2022-03-29 DIAGNOSIS — I34.0 MITRAL VALVE INSUFFICIENCY, UNSPECIFIED ETIOLOGY: Primary | ICD-10-CM

## 2022-03-29 PROCEDURE — 99214 OFFICE O/P EST MOD 30 MIN: CPT | Performed by: INTERNAL MEDICINE

## 2023-03-16 DIAGNOSIS — Z86.39 HISTORY OF ELEVATED GLUCOSE: ICD-10-CM

## 2023-03-16 DIAGNOSIS — E78.5 HYPERLIPIDEMIA, UNSPECIFIED HYPERLIPIDEMIA TYPE: ICD-10-CM

## 2023-03-16 DIAGNOSIS — E78.2 MIXED HYPERLIPIDEMIA: Primary | ICD-10-CM

## 2023-03-21 ENCOUNTER — LAB (OUTPATIENT)
Dept: LAB | Facility: OTHER | Age: 78
End: 2023-03-21
Payer: MEDICARE

## 2023-03-21 DIAGNOSIS — E78.5 HYPERLIPIDEMIA, UNSPECIFIED HYPERLIPIDEMIA TYPE: ICD-10-CM

## 2023-03-21 DIAGNOSIS — E78.2 MIXED HYPERLIPIDEMIA: ICD-10-CM

## 2023-03-21 DIAGNOSIS — Z86.39 HISTORY OF ELEVATED GLUCOSE: ICD-10-CM

## 2023-03-21 LAB
ALBUMIN SERPL-MCNC: 4.2 G/DL (ref 3.5–5)
ALBUMIN/GLOB SERPL: 1.6 G/DL (ref 1.1–1.8)
ALP SERPL-CCNC: 69 U/L (ref 38–126)
ALT SERPL W P-5'-P-CCNC: 24 U/L
ANION GAP SERPL CALCULATED.3IONS-SCNC: 8 MMOL/L (ref 5–15)
AST SERPL-CCNC: 27 U/L (ref 14–36)
BASOPHILS # BLD AUTO: 0.03 10*3/MM3 (ref 0–0.2)
BASOPHILS NFR BLD AUTO: 0.6 % (ref 0–1.5)
BILIRUB SERPL-MCNC: 0.6 MG/DL (ref 0.2–1.3)
BUN SERPL-MCNC: 10 MG/DL (ref 7–23)
BUN/CREAT SERPL: 12.7 (ref 7–25)
CALCIUM SPEC-SCNC: 9.4 MG/DL (ref 8.4–10.2)
CHLORIDE SERPL-SCNC: 102 MMOL/L (ref 101–112)
CHOLEST SERPL-MCNC: 245 MG/DL (ref 150–200)
CO2 SERPL-SCNC: 29 MMOL/L (ref 22–30)
CREAT SERPL-MCNC: 0.79 MG/DL (ref 0.52–1.04)
DEPRECATED RDW RBC AUTO: 40.7 FL (ref 37–54)
EGFRCR SERPLBLD CKD-EPI 2021: 77.2 ML/MIN/1.73
EOSINOPHIL # BLD AUTO: 0.09 10*3/MM3 (ref 0–0.4)
EOSINOPHIL NFR BLD AUTO: 1.7 % (ref 0.3–6.2)
ERYTHROCYTE [DISTWIDTH] IN BLOOD BY AUTOMATED COUNT: 13.3 % (ref 12.3–15.4)
GLOBULIN UR ELPH-MCNC: 2.7 GM/DL (ref 2.3–3.5)
GLUCOSE SERPL-MCNC: 166 MG/DL (ref 70–99)
HBA1C MFR BLD: 6.7 % (ref 4.8–5.6)
HCT VFR BLD AUTO: 44.6 % (ref 34–46.6)
HDLC SERPL-MCNC: 52 MG/DL (ref 40–59)
HGB BLD-MCNC: 14.8 G/DL (ref 12–15.9)
LDLC SERPL CALC-MCNC: 166 MG/DL
LDLC/HDLC SERPL: 3.15 {RATIO} (ref 0–3.22)
LYMPHOCYTES # BLD AUTO: 1.38 10*3/MM3 (ref 0.7–3.1)
LYMPHOCYTES NFR BLD AUTO: 25.6 % (ref 19.6–45.3)
MCH RBC QN AUTO: 28.8 PG (ref 26.6–33)
MCHC RBC AUTO-ENTMCNC: 33.2 G/DL (ref 31.5–35.7)
MCV RBC AUTO: 86.8 FL (ref 79–97)
MONOCYTES # BLD AUTO: 0.59 10*3/MM3 (ref 0.1–0.9)
MONOCYTES NFR BLD AUTO: 10.9 % (ref 5–12)
NEUTROPHILS NFR BLD AUTO: 3.31 10*3/MM3 (ref 1.7–7)
NEUTROPHILS NFR BLD AUTO: 61.2 % (ref 42.7–76)
PLATELET # BLD AUTO: 268 10*3/MM3 (ref 140–450)
PMV BLD AUTO: 11.5 FL (ref 6–12)
POTASSIUM SERPL-SCNC: 3.9 MMOL/L (ref 3.4–5)
PROT SERPL-MCNC: 6.9 G/DL (ref 6.3–8.6)
RBC # BLD AUTO: 5.14 10*6/MM3 (ref 3.77–5.28)
SODIUM SERPL-SCNC: 139 MMOL/L (ref 137–145)
TRIGL SERPL-MCNC: 147 MG/DL
TSH SERPL DL<=0.05 MIU/L-ACNC: 2.28 UIU/ML (ref 0.27–4.2)
VLDLC SERPL-MCNC: 27 MG/DL (ref 5–40)
WBC NRBC COR # BLD: 5.4 10*3/MM3 (ref 3.4–10.8)

## 2023-03-21 PROCEDURE — 36415 COLL VENOUS BLD VENIPUNCTURE: CPT | Performed by: NURSE PRACTITIONER

## 2023-03-21 PROCEDURE — 80061 LIPID PANEL: CPT | Performed by: NURSE PRACTITIONER

## 2023-03-21 PROCEDURE — 83036 HEMOGLOBIN GLYCOSYLATED A1C: CPT | Performed by: NURSE PRACTITIONER

## 2023-03-21 PROCEDURE — 80053 COMPREHEN METABOLIC PANEL: CPT | Performed by: NURSE PRACTITIONER

## 2023-03-21 PROCEDURE — 85025 COMPLETE CBC W/AUTO DIFF WBC: CPT | Performed by: NURSE PRACTITIONER

## 2023-03-21 PROCEDURE — 84443 ASSAY THYROID STIM HORMONE: CPT | Performed by: NURSE PRACTITIONER

## 2023-03-22 ENCOUNTER — LAB (OUTPATIENT)
Dept: LAB | Facility: OTHER | Age: 78
End: 2023-03-22
Payer: MEDICARE

## 2023-03-22 ENCOUNTER — OFFICE VISIT (OUTPATIENT)
Dept: FAMILY MEDICINE CLINIC | Facility: CLINIC | Age: 78
End: 2023-03-22
Payer: MEDICARE

## 2023-03-22 VITALS
HEART RATE: 114 BPM | SYSTOLIC BLOOD PRESSURE: 130 MMHG | DIASTOLIC BLOOD PRESSURE: 70 MMHG | BODY MASS INDEX: 22.4 KG/M2 | HEIGHT: 64 IN | OXYGEN SATURATION: 98 % | WEIGHT: 131.2 LBS | TEMPERATURE: 97.2 F

## 2023-03-22 DIAGNOSIS — Z12.31 ENCOUNTER FOR SCREENING MAMMOGRAM FOR MALIGNANT NEOPLASM OF BREAST: ICD-10-CM

## 2023-03-22 DIAGNOSIS — R00.0 TACHYCARDIA: Chronic | ICD-10-CM

## 2023-03-22 DIAGNOSIS — R73.03 PREDIABETES: Chronic | ICD-10-CM

## 2023-03-22 DIAGNOSIS — Z00.00 MEDICARE ANNUAL WELLNESS VISIT, SUBSEQUENT: Primary | ICD-10-CM

## 2023-03-22 DIAGNOSIS — R30.0 DYSURIA: ICD-10-CM

## 2023-03-22 DIAGNOSIS — E78.2 MIXED HYPERLIPIDEMIA: Chronic | ICD-10-CM

## 2023-03-22 DIAGNOSIS — R01.1 MURMUR, CARDIAC: ICD-10-CM

## 2023-03-22 LAB
BACTERIA UR QL AUTO: ABNORMAL /HPF
BILIRUB UR QL STRIP: NEGATIVE
CLARITY UR: ABNORMAL
COLOR UR: YELLOW
GLUCOSE UR STRIP-MCNC: NEGATIVE MG/DL
HGB UR QL STRIP.AUTO: NEGATIVE
HYALINE CASTS UR QL AUTO: ABNORMAL /LPF
KETONES UR QL STRIP: NEGATIVE
LEUKOCYTE ESTERASE UR QL STRIP.AUTO: ABNORMAL
MUCOUS THREADS URNS QL MICRO: ABNORMAL /HPF
NITRITE UR QL STRIP: NEGATIVE
PH UR STRIP.AUTO: 5.5 [PH] (ref 5.5–8)
PROT UR QL STRIP: NEGATIVE
RBC # UR STRIP: ABNORMAL /HPF
REF LAB TEST METHOD: ABNORMAL
SP GR UR STRIP: 1.02 (ref 1–1.03)
SQUAMOUS #/AREA URNS HPF: ABNORMAL /HPF
UROBILINOGEN UR QL STRIP: ABNORMAL
WBC # UR STRIP: ABNORMAL /HPF

## 2023-03-22 PROCEDURE — 87086 URINE CULTURE/COLONY COUNT: CPT | Performed by: NURSE PRACTITIONER

## 2023-03-22 PROCEDURE — 81001 URINALYSIS AUTO W/SCOPE: CPT | Performed by: NURSE PRACTITIONER

## 2023-03-22 RX ORDER — METOPROLOL SUCCINATE 50 MG/1
50 TABLET, EXTENDED RELEASE ORAL DAILY
Qty: 30 TABLET | Refills: 5 | Status: SHIPPED | OUTPATIENT
Start: 2023-03-22

## 2023-03-22 RX ORDER — ATORVASTATIN CALCIUM 20 MG/1
20 TABLET, FILM COATED ORAL NIGHTLY
Qty: 30 TABLET | Refills: 5 | Status: SHIPPED | OUTPATIENT
Start: 2023-03-22

## 2023-03-22 NOTE — PROGRESS NOTES
The ABCs of the Annual Wellness Visit  Subsequent Medicare Wellness Visit    Subjective    {Wrapup  Review (Popup)  Advance Care Planning  Labs  CC  Problem List  Visit Diagnosis  Medications  Result Review  Imaging  [facility]  [facility]  [facility]  SnapShot  Encounters  Notes  Media  Procedures :23}  Kimi Buck is a 78 y.o. female who presents for a Subsequent Medicare Wellness Visit.    The following portions of the patient's history were reviewed and   updated as appropriate: allergies, current medications, past family history, past medical history, past social history, past surgical history and problem list.    Compared to one year ago, the patient feels her physical   health is the same.    Compared to one year ago, the patient feels her mental   health is the same.    Recent Hospitalizations:  She was not admitted to the hospital during the last year.     Current Medical Providers:  Patient Care Team:  Bhakti Tran APRN as PCP - General (Family Medicine)    Outpatient Medications Prior to Visit   Medication Sig Dispense Refill   • albuterol sulfate  (90 Base) MCG/ACT inhaler Inhale 2 puffs Every 4 (Four) Hours As Needed for Wheezing. 6.7 g 0   • aspirin 81 MG chewable tablet Chew 1 tablet Daily.     • dilTIAZem CD (CARDIZEM CD) 180 MG 24 hr capsule Take 1 capsule by mouth Daily. 30 capsule 11   • ferrous sulfate 325 (65 FE) MG tablet Take 1 tablet by mouth.     • metFORMIN (Glucophage) 500 MG tablet Take 1 tablet by mouth 2 (Two) Times a Day With Meals. 60 tablet 2   • metoprolol succinate XL (TOPROL-XL) 50 MG 24 hr tablet Take 1 tablet by mouth Daily. (Patient taking differently: Take 1 tablet by mouth Daily. 0.5 tablet daily) 30 tablet 11   • atorvastatin (LIPITOR) 20 MG tablet TAKE 1 TABLET BY MOUTH EVERY EVENING. [MUST HAVE APPOINTMENT FOR ADDITIONAL REFILLS] 60 tablet 1   • azithromycin (Zithromax Z-Adonis) 250 MG tablet Take 2 po now and 1 po d2-5 6  "tablet 0   • methylPREDNISolone (MEDROL) 4 MG dose pack Take as directed on package instructions. 1 each 0     No facility-administered medications prior to visit.       No opioid medication identified on active medication list. I have reviewed chart for other potential  high risk medication/s and harmful drug interactions in the elderly.          Aspirin is on active medication list. Aspirin use is indicated based on review of current medical condition/s. Pros and cons of this therapy have been discussed today. Benefits of this medication outweigh potential harm.  Patient has been encouraged to continue taking this medication.  .      Patient Active Problem List   Diagnosis   • Abnormal CXR   • History of tobacco use   • Murmur, cardiac   • Dysphagia   • Left carotid bruit     Advance Care Planning  Advance Directive is not on file.  ACP discussion was held with the patient during this visit. Patient does not have an advance directive, declines further assistance.     Objective    Vitals:    03/22/23 0955   BP: 130/70   Pulse: 114   Temp: 97.2 °F (36.2 °C)   SpO2: 98%   Weight: 59.5 kg (131 lb 3.2 oz)   Height: 161.5 cm (63.6\")   PainSc: 0-No pain     Estimated body mass index is 22.8 kg/m² as calculated from the following:    Height as of this encounter: 161.5 cm (63.6\").    Weight as of this encounter: 59.5 kg (131 lb 3.2 oz).    BMI is within normal parameters. No other follow-up for BMI required.      Does the patient have evidence of cognitive impairment?   No    Lab Results   Component Value Date    TRIG 147 03/21/2023    HDL 52 03/21/2023     (H) 03/21/2023    VLDL 27 03/21/2023    HGBA1C 6.70 (H) 03/21/2023          HEALTH RISK ASSESSMENT    Smoking Status:  Social History     Tobacco Use   Smoking Status Former   Smokeless Tobacco Never     Alcohol Consumption:  Social History     Substance and Sexual Activity   Alcohol Use No     Fall Risk Screen:    CLARK Fall Risk Assessment was completed, and " patient is at LOW risk for falls.Assessment completed on:3/22/2023    Depression Screening:  PHQ-2/PHQ-9 Depression Screening 3/22/2023   Little Interest or Pleasure in Doing Things 0-->not at all   Feeling Down, Depressed or Hopeless 0-->not at all   PHQ-9: Brief Depression Severity Measure Score 0       Health Habits and Functional and Cognitive Screening:  Functional & Cognitive Status 3/22/2023   Do you have difficulty preparing food and eating? No   Do you have difficulty bathing yourself, getting dressed or grooming yourself? No   Do you have difficulty using the toilet? No   Do you have difficulty moving around from place to place? No   Do you have trouble with steps or getting out of a bed or a chair? No   Current Diet Well Balanced Diet   Dental Exam Up to date   Eye Exam Up to date   Exercise (times per week) 3 times per week   Current Exercises Include Walking   Current Exercise Activities Include -   Do you need help using the phone?  No   Are you deaf or do you have serious difficulty hearing?  No   Do you need help with transportation? No   Do you need help shopping? No   Do you need help preparing meals?  No   Do you need help with housework?  No   Do you need help with laundry? No   Do you need help taking your medications? No   Do you need help managing money? No   Do you ever drive or ride in a car without wearing a seat belt? No   Have you felt unusual stress, anger or loneliness in the last month? No   Who do you live with? Spouse   If you need help, do you have trouble finding someone available to you? No   Have you been bothered in the last four weeks by sexual problems? No   Do you have difficulty concentrating, remembering or making decisions? No       Age-appropriate Screening Schedule:  Refer to the list below for future screening recommendations based on patient's age, sex and/or medical conditions. Orders for these recommended tests are listed in the plan section. The patient has been  provided with a written plan.    Health Maintenance   Topic Date Due   • MAMMOGRAM  12/18/2020   • ZOSTER VACCINE (3 of 3) 10/11/2022   • DXA SCAN  03/22/2023 (Originally 6/19/2019)   • INFLUENZA VACCINE  03/31/2023 (Originally 8/1/2022)   • COVID-19 Vaccine (2 - Moderna series) 03/22/2024 (Originally 9/27/2022)   • LIPID PANEL  03/21/2024   • ANNUAL WELLNESS VISIT  03/22/2024   • TDAP/TD VACCINES (3 - Td or Tdap) 06/06/2027   • COLORECTAL CANCER SCREENING  05/19/2030   • HEPATITIS C SCREENING  Completed   • Pneumococcal Vaccine 65+  Completed                CMS Preventative Services Quick Reference  Risk Factors Identified During Encounter:    {Medicare Wellness Risk Factors:74524}    The above risks/problems have been discussed with the patient.  Pertinent information has been shared with the patient in the After Visit Summary.    Diagnoses and all orders for this visit:    1. Medicare annual wellness visit, subsequent (Primary)    2. Mixed hyperlipidemia  -     atorvastatin (LIPITOR) 20 MG tablet; Take 1 tablet by mouth Every Night.  Dispense: 30 tablet; Refill: 5    3. Tachycardia  -     Ambulatory Referral to Cardiology  -     metoprolol succinate XL (TOPROL-XL) 50 MG 24 hr tablet; Take 1 tablet by mouth Daily.  Dispense: 30 tablet; Refill: 5    4. Prediabetes  -     metFORMIN (Glucophage) 500 MG tablet; Take 1 tablet by mouth 2 (Two) Times a Day With Meals.  Dispense: 60 tablet; Refill: 5    5. Encounter for screening mammogram for malignant neoplasm of breast  -     Mammo Screening Digital Tomosynthesis Bilateral With CAD; Future    6. Dysuria  -     Urinalysis With Culture If Indicated - Urine, Clean Catch; Future    7. Murmur, cardiac  -     Ambulatory Referral to Cardiology        Follow Up:   Next Medicare Wellness visit to be scheduled in 1 year.      An After Visit Summary and PPPS were made available to the patient.

## 2023-03-22 NOTE — PROGRESS NOTES
The ABCs of the Annual Wellness Visit  Subsequent Medicare Wellness Visit    Subjective    Kimi Buck is a 78 y.o. female who presents for a Subsequent Medicare Wellness Visit.    The following portions of the patient's history were reviewed and   updated as appropriate: allergies, current medications, past family history, past medical history, past social history, past surgical history and problem list.    Compared to one year ago, the patient feels her physical   health is the same.    Compared to one year ago, the patient feels her mental   health is the same.    Recent Hospitalizations:  She was not admitted to the hospital during the last year.       Current Medical Providers:  Patient Care Team:  Bhakti Tran APRN as PCP - General (Family Medicine)    Outpatient Medications Prior to Visit   Medication Sig Dispense Refill   • albuterol sulfate  (90 Base) MCG/ACT inhaler Inhale 2 puffs Every 4 (Four) Hours As Needed for Wheezing. 6.7 g 0   • aspirin 81 MG chewable tablet Chew 1 tablet Daily.     • dilTIAZem CD (CARDIZEM CD) 180 MG 24 hr capsule Take 1 capsule by mouth Daily. 30 capsule 11   • ferrous sulfate 325 (65 FE) MG tablet Take 1 tablet by mouth.     • metFORMIN (Glucophage) 500 MG tablet Take 1 tablet by mouth 2 (Two) Times a Day With Meals. 60 tablet 2   • metoprolol succinate XL (TOPROL-XL) 50 MG 24 hr tablet Take 1 tablet by mouth Daily. (Patient taking differently: Take 1 tablet by mouth Daily. 0.5 tablet daily) 30 tablet 11   • atorvastatin (LIPITOR) 20 MG tablet TAKE 1 TABLET BY MOUTH EVERY EVENING. [MUST HAVE APPOINTMENT FOR ADDITIONAL REFILLS] 60 tablet 1   • azithromycin (Zithromax Z-Adonis) 250 MG tablet Take 2 po now and 1 po d2-5 6 tablet 0   • methylPREDNISolone (MEDROL) 4 MG dose pack Take as directed on package instructions. 1 each 0     No facility-administered medications prior to visit.       No opioid medication identified on active medication list. I have reviewed chart  "for other potential  high risk medication/s and harmful drug interactions in the elderly.          Aspirin is on active medication list. Aspirin use is indicated based on review of current medical condition/s. Pros and cons of this therapy have been discussed today. Benefits of this medication outweigh potential harm.  Patient has been encouraged to continue taking this medication.  .      Patient Active Problem List   Diagnosis   • Abnormal CXR   • History of tobacco use   • Murmur, cardiac   • Dysphagia   • Left carotid bruit     Advance Care Planning  Advance Directive is not on file.  ACP discussion was held with the patient during this visit. Patient does not have an advance directive, declines further assistance.     Objective    Vitals:    03/22/23 0955   BP: 130/70   Pulse: 114   Temp: 97.2 °F (36.2 °C)   SpO2: 98%   Weight: 59.5 kg (131 lb 3.2 oz)   Height: 161.5 cm (63.6\")   PainSc: 0-No pain     Estimated body mass index is 22.8 kg/m² as calculated from the following:    Height as of this encounter: 161.5 cm (63.6\").    Weight as of this encounter: 59.5 kg (131 lb 3.2 oz).    BMI is within normal parameters. No other follow-up for BMI required.      Does the patient have evidence of cognitive impairment? No    Lab Results   Component Value Date    TRIG 147 03/21/2023    HDL 52 03/21/2023     (H) 03/21/2023    VLDL 27 03/21/2023    HGBA1C 6.70 (H) 03/21/2023        HEALTH RISK ASSESSMENT    Smoking Status:  Social History     Tobacco Use   Smoking Status Former   Smokeless Tobacco Never     Alcohol Consumption:  Social History     Substance and Sexual Activity   Alcohol Use No     Fall Risk Screen:    STEADI Fall Risk Assessment was completed, and patient is at LOW risk for falls.Assessment completed on:3/22/2023    Depression Screening:  PHQ-2/PHQ-9 Depression Screening 3/22/2023   Little Interest or Pleasure in Doing Things 0-->not at all   Feeling Down, Depressed or Hopeless 0-->not at all "   PHQ-9: Brief Depression Severity Measure Score 0       Health Habits and Functional and Cognitive Screening:  Functional & Cognitive Status 3/22/2023   Do you have difficulty preparing food and eating? No   Do you have difficulty bathing yourself, getting dressed or grooming yourself? No   Do you have difficulty using the toilet? No   Do you have difficulty moving around from place to place? No   Do you have trouble with steps or getting out of a bed or a chair? No   Current Diet Well Balanced Diet   Dental Exam Up to date   Eye Exam Up to date   Exercise (times per week) 3 times per week   Current Exercises Include Walking   Current Exercise Activities Include -   Do you need help using the phone?  No   Are you deaf or do you have serious difficulty hearing?  No   Do you need help with transportation? No   Do you need help shopping? No   Do you need help preparing meals?  No   Do you need help with housework?  No   Do you need help with laundry? No   Do you need help taking your medications? No   Do you need help managing money? No   Do you ever drive or ride in a car without wearing a seat belt? No   Have you felt unusual stress, anger or loneliness in the last month? No   Who do you live with? Spouse   If you need help, do you have trouble finding someone available to you? No   Have you been bothered in the last four weeks by sexual problems? No   Do you have difficulty concentrating, remembering or making decisions? No       Age-appropriate Screening Schedule:  Refer to the list below for future screening recommendations based on patient's age, sex and/or medical conditions. Orders for these recommended tests are listed in the plan section. The patient has been provided with a written plan.    Health Maintenance   Topic Date Due   • MAMMOGRAM  12/18/2020   • ZOSTER VACCINE (3 of 3) 10/11/2022   • DXA SCAN  03/22/2023 (Originally 6/19/2019)   • INFLUENZA VACCINE  03/31/2023 (Originally 8/1/2022)   • LIPID PANEL   03/21/2024   • ANNUAL WELLNESS VISIT  03/22/2024   • TDAP/TD VACCINES (3 - Td or Tdap) 06/06/2027   • COLORECTAL CANCER SCREENING  05/19/2030   • HEPATITIS C SCREENING  Completed   • COVID-19 Vaccine  Completed   • Pneumococcal Vaccine 65+  Completed                CMS Preventative Services Quick Reference  Risk Factors Identified During Encounter  Immunizations Discussed/Encouraged: Shingrix  Polypharmacy: Medication List reviewed, Medications are appropriate for patient and Medication changes were made  The above risks/problems have been discussed with the patient.  Pertinent information has been shared with the patient in the After Visit Summary.  An After Visit Summary and PPPS were made available to the patient.    Follow Up:   Next Medicare Wellness visit to be scheduled in 1 year.       Additional E&M Note during same encounter follows:  Patient has multiple medical problems which are significant and separately identifiable that require additional work above and beyond the Medicare Wellness Visit.      Chief Complaint  Medicare Wellness-subsequent    Subjective        HPI  Kimi Buck is also being seen today for subsequent Medicare wellness and for lab results.  She tells me today that she has not been taking her statin beta-blocker or metformin for an unknown amount of time.  Heart rate is elevated today.  She is also not been following up with cardiology since Dr. Hercules left.  Tells me that she feels good and she has no fatigue or chest pain or shortness of breath.  She states that she really did not like the way that the medication made her feel so she stopped them on her own.  She had labs drawn yesterday and is here for those results.  Is due for mammogram and bone density, it appears that she is due for Shingrix as well but she states that she has had this at her pharmacy.  We will contact them to update her records.  She is also here today complaining of dysuria.  States that her symptoms have  "improved but have not resolved and she would like to have a urinalysis today.     Objective   Vital Signs:  /70   Pulse 114   Temp 97.2 °F (36.2 °C)   Ht 161.5 cm (63.6\")   Wt 59.5 kg (131 lb 3.2 oz)   SpO2 98%   BMI 22.80 kg/m²     Physical Exam  Vitals and nursing note reviewed.   Constitutional:       General: She is not in acute distress.     Appearance: Normal appearance. She is normal weight. She is not ill-appearing, toxic-appearing or diaphoretic.   HENT:      Head: Normocephalic and atraumatic.   Cardiovascular:      Rate and Rhythm: Regular rhythm. Tachycardia present.      Heart sounds: Murmur heard.     No friction rub. No gallop.   Pulmonary:      Effort: Pulmonary effort is normal. No respiratory distress.      Breath sounds: Normal breath sounds. No stridor. No wheezing, rhonchi or rales.   Musculoskeletal:      Right lower leg: No edema.      Left lower leg: No edema.   Skin:     General: Skin is warm and dry.      Coloration: Skin is not jaundiced or pale.      Findings: No bruising, erythema, lesion or rash.   Neurological:      Mental Status: She is alert and oriented to person, place, and time.      Cranial Nerves: No cranial nerve deficit.      Motor: No weakness.      Coordination: Coordination normal.      Gait: Gait normal.   Psychiatric:         Mood and Affect: Mood normal.         Behavior: Behavior normal.         Thought Content: Thought content normal.         Judgment: Judgment normal.            CMP    CMP 3/21/23   Glucose 166 (A)   BUN 10   Creatinine 0.79   eGFR 77.2   Sodium 139   Potassium 3.9   Chloride 102   Calcium 9.4   Total Protein 6.9   Albumin 4.2   Globulin 2.7   Total Bilirubin 0.6   Alkaline Phosphatase 69   AST (SGOT) 27   ALT (SGPT) 24   Albumin/Globulin Ratio 1.6   BUN/Creatinine Ratio 12.7   Anion Gap 8.0   (A) Abnormal value            CBC    CBC 3/21/23   WBC 5.40   RBC 5.14   Hemoglobin 14.8   Hematocrit 44.6   MCV 86.8   MCH 28.8   MCHC 33.2   RDW " 13.3   Platelets 268           CBC w/diff    CBC w/Diff 3/21/23   WBC 5.40   RBC 5.14   Hemoglobin 14.8   Hematocrit 44.6   MCV 86.8   MCH 28.8   MCHC 33.2   RDW 13.3   Platelets 268   Neutrophil Rel % 61.2   Lymphocyte Rel % 25.6   Monocyte Rel % 10.9   Eosinophil Rel % 1.7   Basophil Rel % 0.6           Lipid Panel    Lipid Panel 3/21/23   Total Cholesterol 245 (A)   Triglycerides 147   HDL Cholesterol 52   VLDL Cholesterol 27   LDL Cholesterol  166 (A)   LDL/HDL Ratio 3.15   (A) Abnormal value            TSH    TSH 3/21/23   TSH 2.280                      Assessment and Plan   Diagnoses and all orders for this visit:    1. Medicare annual wellness visit, subsequent (Primary)    2. Mixed hyperlipidemia  -     atorvastatin (LIPITOR) 20 MG tablet; Take 1 tablet by mouth Every Night.  Dispense: 30 tablet; Refill: 5    3. Tachycardia  -     Ambulatory Referral to Cardiology  -     metoprolol succinate XL (TOPROL-XL) 50 MG 24 hr tablet; Take 1 tablet by mouth Daily.  Dispense: 30 tablet; Refill: 5    4. Prediabetes  -     metFORMIN (Glucophage) 500 MG tablet; Take 1 tablet by mouth 2 (Two) Times a Day With Meals.  Dispense: 60 tablet; Refill: 5    5. Encounter for screening mammogram for malignant neoplasm of breast  -     Mammo Screening Digital Tomosynthesis Bilateral With CAD; Future    6. Dysuria  -     Urinalysis With Culture If Indicated - Urine, Clean Catch; Future    7. Murmur, cardiac  -     Ambulatory Referral to Cardiology    Subsequent Medicare wellness completed, patient referred back to cardiology for at least a yearly checkup, may very likely need repeated echocardiogram and patient made aware of this.  Mammogram ordered for her and she can schedule at her convenience, bone density declined.  Lab results are discussed with her and copies are given to her.  She is STRONGLY advised to restart metformin Toprol and Lipitor and these are refilled for her today after some encouragement.  She has been reluctant  to want to restart these but again is strongly encouraged to restart them due to her lab results and tachycardia.  Lab murmur auscultated again referred back to cardiology, referral to Dr. Sultan hurtado.  I will obtain urinalysis and inform her of results via phone.  All questions and concerns addressed with understanding verbalized.  Patient aware and in agreement to this plan.  She is strongly encouraged to follow-up every 6 months for recheck.       I spent 30 minutes caring for Kimi on this date of service. This time includes time spent by me in the following activities:preparing for the visit, reviewing tests, performing a medically appropriate examination and/or evaluation , counseling and educating the patient/family/caregiver, ordering medications, tests, or procedures, referring and communicating with other health care professionals  and documenting information in the medical record  Follow Up   Return in about 6 months (around 9/22/2023), or if symptoms worsen or fail to improve, for Recheck, or sooner as needed.  Patient was given instructions and counseling regarding her condition or for health maintenance advice. Please see specific information pulled into the AVS if appropriate.

## 2023-03-23 LAB — BACTERIA SPEC AEROBE CULT: NO GROWTH

## 2023-08-17 DIAGNOSIS — E78.2 MIXED HYPERLIPIDEMIA: Chronic | ICD-10-CM

## 2023-08-17 DIAGNOSIS — R73.03 PREDIABETES: Chronic | ICD-10-CM

## 2023-08-17 DIAGNOSIS — R00.0 TACHYCARDIA: Chronic | ICD-10-CM

## 2023-08-17 RX ORDER — METOPROLOL SUCCINATE 50 MG/1
50 TABLET, EXTENDED RELEASE ORAL DAILY
Qty: 30 TABLET | Refills: 0 | Status: SHIPPED | OUTPATIENT
Start: 2023-08-17

## 2023-08-17 RX ORDER — ATORVASTATIN CALCIUM 20 MG/1
20 TABLET, FILM COATED ORAL NIGHTLY
Qty: 30 TABLET | Refills: 0 | Status: SHIPPED | OUTPATIENT
Start: 2023-08-17

## 2023-09-15 DIAGNOSIS — E78.2 MIXED HYPERLIPIDEMIA: Chronic | ICD-10-CM

## 2023-09-18 RX ORDER — ATORVASTATIN CALCIUM 20 MG/1
20 TABLET, FILM COATED ORAL NIGHTLY
Qty: 30 TABLET | Refills: 5 | Status: SHIPPED | OUTPATIENT
Start: 2023-09-18

## (undated) DEVICE — SINGLE-USE BIOPSY FORCEPS: Brand: RADIAL JAW 4